# Patient Record
Sex: FEMALE | Race: WHITE | NOT HISPANIC OR LATINO | Employment: OTHER | URBAN - METROPOLITAN AREA
[De-identification: names, ages, dates, MRNs, and addresses within clinical notes are randomized per-mention and may not be internally consistent; named-entity substitution may affect disease eponyms.]

---

## 2023-04-19 ENCOUNTER — OFFICE VISIT (OUTPATIENT)
Dept: ORTHOPEDIC SURGERY | Facility: CLINIC | Age: 78
End: 2023-04-19
Payer: MEDICARE

## 2023-04-19 VITALS
BODY MASS INDEX: 26.75 KG/M2 | HEIGHT: 63 IN | WEIGHT: 151 LBS | SYSTOLIC BLOOD PRESSURE: 160 MMHG | DIASTOLIC BLOOD PRESSURE: 82 MMHG

## 2023-04-19 DIAGNOSIS — M16.12 PRIMARY OSTEOARTHRITIS OF LEFT HIP: Primary | ICD-10-CM

## 2023-04-19 DIAGNOSIS — M87.052 AVASCULAR NECROSIS OF BONE OF LEFT HIP: ICD-10-CM

## 2023-04-19 PROCEDURE — 1159F MED LIST DOCD IN RCRD: CPT | Performed by: ORTHOPAEDIC SURGERY

## 2023-04-19 PROCEDURE — 99204 OFFICE O/P NEW MOD 45 MIN: CPT | Performed by: ORTHOPAEDIC SURGERY

## 2023-04-19 PROCEDURE — 1160F RVW MEDS BY RX/DR IN RCRD: CPT | Performed by: ORTHOPAEDIC SURGERY

## 2023-04-19 RX ORDER — IBUPROFEN 200 MG
TABLET ORAL EVERY 6 HOURS PRN
COMMUNITY

## 2023-04-19 RX ORDER — CHOLECALCIFEROL (VITAMIN D3) 125 MCG
2.5 CAPSULE ORAL NIGHTLY
COMMUNITY

## 2023-04-19 RX ORDER — ACETAMINOPHEN 325 MG/1
1000 TABLET ORAL ONCE
OUTPATIENT
Start: 2023-04-19 | End: 2023-04-19

## 2023-04-19 RX ORDER — PREGABALIN 150 MG/1
150 CAPSULE ORAL ONCE
OUTPATIENT
Start: 2023-04-19 | End: 2023-04-19

## 2023-04-19 RX ORDER — AMLODIPINE BESYLATE 5 MG/1
1 TABLET ORAL DAILY
COMMUNITY
Start: 2023-04-06

## 2023-04-19 RX ORDER — DOXYCYCLINE 100 MG/1
1 CAPSULE ORAL EVERY 12 HOURS SCHEDULED
COMMUNITY
Start: 2023-04-17

## 2023-04-19 RX ORDER — TRAMADOL HYDROCHLORIDE 50 MG/1
50 TABLET ORAL EVERY 8 HOURS PRN
Qty: 30 TABLET | Refills: 0 | Status: SHIPPED | OUTPATIENT
Start: 2023-04-19

## 2023-04-19 RX ORDER — MELOXICAM 7.5 MG/1
15 TABLET ORAL ONCE
OUTPATIENT
Start: 2023-04-19 | End: 2023-04-19

## 2023-04-19 RX ORDER — ACETAMINOPHEN 500 MG
500 TABLET ORAL EVERY 6 HOURS PRN
COMMUNITY

## 2023-04-19 RX ORDER — CELECOXIB 100 MG/1
1 CAPSULE ORAL EVERY 12 HOURS SCHEDULED
COMMUNITY
Start: 2023-04-04

## 2023-04-19 RX ORDER — TRIAMTERENE AND HYDROCHLOROTHIAZIDE 37.5; 25 MG/1; MG/1
1 CAPSULE ORAL DAILY
COMMUNITY
Start: 2023-04-05

## 2023-04-19 NOTE — H&P (VIEW-ONLY)
[facility]    Subjective     Chief Complaint   Patient presents with   • Left Hip - Pain        HPI    Janet Darden is a 77 y.o. female who presents with new problem of: left hip pain.  Onset: atraumatic and gradual in nature. The issue has been ongoing for 4 year(s). Pain is a 10/10 on the pain scale. Pain is described as aching and shooting. Associated symptoms include pain and popping. The pain is worse with walking, standing, climbing stairs, sleeping and lying on affected side; heat and pain medication and/or NSAID improve the pain. Previous treatments have included: NSAIDS, physical therapy and steroid injection (last injection 08/2022), in [place] with Dr. Zayas.  Injection provided relief up until December, but she has had worsening pain since then.  Pain is located in the groin region.  No improvement with Celebrex.  She currently takes ibuprofen and Tylenol with some improvement.  She is interested in hip replacement surgery.  No history of clots or clotting disorders.  No blood thinners.  No heart disease or diabetes.  She has family members that can help her out postoperatively.  She lives in [place], [place].    I have reviewed the following portions of the patient's history and agree with: History of Present Illness and Review of Systems    Patient Active Problem List   Diagnosis   • Primary osteoarthritis of left hip     Past Medical History:   Diagnosis Date   • Hypertension       Past Surgical History:   Procedure Laterality Date   • CHOLECYSTECTOMY  1970   • HYSTERECTOMY  1994   • MOUTH SURGERY  1987    jaw surgery      Family History   Problem Relation Age of Onset   • Cancer Mother    • Cancer Father      Social History     Socioeconomic History   • Marital status:    Tobacco Use   • Smoking status: Never   • Smokeless tobacco: Never   Vaping Use   • Vaping Use: Never used   Substance and Sexual Activity   • Alcohol use: Never   • Drug use: Never   •  Sexual activity: Defer      Current Outpatient Medications on File Prior to Visit   Medication Sig Dispense Refill   • acetaminophen (TYLENOL) 500 MG tablet Take 1 tablet by mouth Every 6 (Six) Hours As Needed for Mild Pain.     • amLODIPine (NORVASC) 5 MG tablet Take 1 tablet by mouth Daily.     • Ascorbic Acid (VITAMIN C PO) Take 1,000 mg by mouth.     • Calcium Carbonate-Vitamin D (CALCIUM 600+D PO) Take  by mouth Daily.     • celecoxib (CeleBREX) 100 MG capsule Take 1 capsule by mouth Every 12 (Twelve) Hours.     • doxycycline (MONODOX) 100 MG capsule Take 1 capsule by mouth Every 12 (Twelve) Hours.     • ibuprofen (ADVIL,MOTRIN) 200 MG tablet Take  by mouth Every 6 (Six) Hours As Needed for Mild Pain. Takes 600mg every 6 hours     • melatonin 5 MG tablet tablet Take 2.5 mg by mouth Every Night.     • triamterene-hydrochlorothiazide (DYAZIDE) 37.5-25 MG per capsule Take 1 capsule by mouth Daily.     • Zinc 50 MG capsule Take  by mouth.       No current facility-administered medications on file prior to visit.      Allergies   Allergen Reactions   • Prednisone Other (See Comments)     Causes insomnia        Review of Systems   Constitutional: Negative for activity change, appetite change, chills, diaphoresis, fatigue, fever and unexpected weight change.   HENT: Negative for congestion, dental problem, drooling, ear discharge, ear pain, facial swelling, hearing loss, mouth sores, nosebleeds, postnasal drip, rhinorrhea, sinus pressure, sneezing, sore throat, tinnitus, trouble swallowing and voice change.    Eyes: Negative for photophobia, pain, discharge, redness, itching and visual disturbance.   Respiratory: Negative for apnea, cough, choking, chest tightness, shortness of breath, wheezing and stridor.    Cardiovascular: Negative for chest pain, palpitations and leg swelling.   Gastrointestinal: Negative for abdominal distention, abdominal pain, anal bleeding, blood in stool, constipation, diarrhea, nausea,  "rectal pain and vomiting.   Endocrine: Negative for cold intolerance, heat intolerance, polydipsia, polyphagia and polyuria.   Genitourinary: Negative for decreased urine volume, difficulty urinating, dysuria, enuresis, flank pain, frequency, genital sores, hematuria and urgency.   Musculoskeletal: Positive for arthralgias. Negative for back pain, gait problem, joint swelling, myalgias, neck pain and neck stiffness.   Skin: Negative for color change, pallor, rash and wound.   Allergic/Immunologic: Negative for environmental allergies, food allergies and immunocompromised state.   Neurological: Negative for dizziness, tremors, seizures, syncope, facial asymmetry, speech difficulty, weakness, light-headedness, numbness and headaches.   Hematological: Negative for adenopathy. Does not bruise/bleed easily.   Psychiatric/Behavioral: Negative for agitation, behavioral problems, confusion, decreased concentration, dysphoric mood, hallucinations, self-injury, sleep disturbance and suicidal ideas. The patient is not nervous/anxious and is not hyperactive.         Objective      Physical Exam  /82   Ht 158.8 cm (62.5\")   Wt 68.5 kg (151 lb)   BMI 27.18 kg/m²     Body mass index is 27.18 kg/m².    General:   Mental Status:  Alert   Appearance: Cooperative, in no acute distress   Build and Nutrition: Well-nourished well-developed female   Orientation: Alert and oriented to person, place and time   Posture: Normal   Gait: Limp on the left    Integument:   Left hip: No skin lesions, no rash, no ecchymosis    Neurologic:   Sensation:    Left foot: Intact to light touch on the dorsal and plantar aspect   Motor:  Left lower extremity: 5/5 quadriceps, hamstrings, ankle dorsiflexors, and ankle plantar flexors  Vascular:   Left lower extremity: 2+ dorsalis pedis pulse, prompt capillary refill    Lower Extremity:   Left Hip:    Tenderness:  Mild lateral tenderness    Swelling:  None    Crepitus:  Positive, palpable and " audible    Atrophy:  None    Range of motion:  External Rotation: 10°, with pain       Internal Rotation: 10°, with pain       Flexion:  90°       Extension:  0°   Instability:  None  Deformities:  None  Functional testing: Positive Stinchfield    Short on the left compared to the right      Imaging/Studies      Imaging Results (Last 24 Hours)     Procedure Component Value Units Date/Time    XR Hip With or Without Pelvis 2 - 3 View Left [[id number]] Resulted: 04/19/23 1541     Updated: 04/19/23 1542    Narrative:      Left Hip Radiographs  Indication: left hip pain  Views: low AP pelvis and lateral of the left hip    Comparison: no prior studies available for review    Findings:   Collapse of the femoral head, with no acute bony abnormalities.  Advanced   degeneration of the left hip, likely secondary to avascular necrosis.          Assessment and Plan     Diagnoses and all orders for this visit:    1. Primary osteoarthritis of left hip (Primary)  -     XR Hip With or Without Pelvis 2 - 3 View Left  -     traMADol (ULTRAM) 50 MG tablet; Take 1 tablet by mouth Every 8 (Eight) Hours As Needed for Moderate Pain.  Dispense: 30 tablet; Refill: 0  -     Case Request; Standing  -     Instructions on coughing, deep breathing, and incentive spirometry.; Future  -     CBC and Differential; Future  -     Basic metabolic panel; Future  -     Protime-INR; Future  -     APTT; Future  -     Hemoglobin A1c; Future  -     Sedimentation rate; Future  -     C-reactive protein; Future  -     Tranexamic Acid 1,000 mg in sodium chloride 0.9 % 100 mL  -     Tranexamic Acid 1,000 mg in sodium chloride 0.9 % 100 mL  -     ethyl alcohol 62 % 2 each  -     ceFAZolin (ANCEF) 2 g in sodium chloride 0.9 % 100 mL IVPB  -     acetaminophen (TYLENOL) tablet 975 mg  -     meloxicam (MOBIC) tablet 15 mg  -     pregabalin (LYRICA) capsule 150 mg  -     Case Request    2. Avascular necrosis of bone of left hip    Other orders  -     Follow Anesthesia  Guidelines / Protocol; Future  -     Obtain informed consent  -     Provide instructions to patient regarding NPO status  -     Chlorhexidine Skin Prep - Educate and Review With Patient; Future  -     Provide Patient With ERAS Hydration Instructions  -     Provide Patient With Enhanced Recovery Booklet(s) or Handout  -     Provide Instructions/Handout For Benzoyl Peroxide 5% Wash If Having Shoulder/Arm Surgery (If Prescribed)  -     Provide Instructions/Handout For Bactroban And Chlorhexidine Shower (If Prescribed)  -     Perform A Memory Screening On All Hip/Knee Replacement Patients >Or Equal To 65 Years Or Older  -     Complete A PROMIS And HOOS Or KOOS Survey If Having Hip Or Knee Replacement  -     Follow Anesthesia Guidelines / Protocol; Standing  -     Verify NPO Status; Standing  -     Verify The Time Patient Completed ERAS Hydration Drink; Standing  -     SCD (sequential compression device)- to be placed on patient in Pre-op; Standing  -     Clip operative site; Standing  -     Obtain informed consent (if not collected inpatient or PAT); Standing  -     Notify Physician - Standard; Standing  -     Provide Patient With Carbo Loading Instructions  -     Provide Patient With ERAS Booklet(s)/Handout  -     Outpatient In A Bed; Standing  -     chlorhexidine (HIBICLENS) 4 % external liquid; Apply  topically to the appropriate area as directed Daily. Shower with hibiclens solution as directed for 5 days prior to surgery  Dispense: 236 mL; Refill: 0        1. Primary osteoarthritis of left hip    2. Avascular necrosis of bone of left hip        I reviewed my findings with the patient.  She has advanced left hip arthritis, with collapse of the femoral head.  Symptoms have worsened over time.  She has exhausted conservative treatment options.  She is very interested in total hip arthroplasty surgery.  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  Risks, benefits, and  alternatives to surgery been discussed.  Her  and daughter are with her today, and are also supportive of her proceeding with hip replacement surgery.  Please see my counseling note for details.  I did provide a limited prescription of tramadol for her to use at nighttime in hopes that this will help between now and the time of surgery.    Surgical Counseling     I have informed the patient of the diagnosis and the prognosis.  Exhaustive conservative treatment modalities have not resulted in long term pain relief.  The symptoms have progressed to the point of daily pain and inability to perform activities of daily living without significant pain.  The patient has reached the point of desiring to proceed with total hip arthroplasty after discussing the risks, benefits and alternatives to the procedure.  The surgical procedure itself was discussed in detail.  Risks of the procedure were discussed, which included but are not limited to, bleeding, infection, damage to blood vessels and nerves, incomplete pain relief, loosening of the prosthesis (early or late), deep infection (early or late), need for further surgery, leg length discrepancy, hip dislocation, loss of limb, deep venous thrombosis, pulmonary embolus, death, heart attack, stroke, kidney failure, liver failure, and anesthetic complications.  In addition, the potential for deep infection developing in the future was discussed, which could require further surgery.  The hip would have to be re-opened, debrided, and potentially remove the prosthesis, which may or may not be replaced in the future.  Also, the possibility for loosening of the prosthesis has been mentioned.  If the prosthesis loosened, a revision arthroplasty could be performed, with results that are not as predictable compared to the original procedure.  The typical rehabilitative course has also been discussed, and full recovery may take up to a year to see the maximum benefit.  The  importance of patient cooperation in the rehabilitative efforts has also been discussed.  No guarantees were given.  The patient understands the potential risks versus the benefits and desires to proceed with total hip arthroplasty at a mutually convenient time.    Return for surgery.      Ezekiel Rodriguez MD  04/19/23  18:57 EDT

## 2023-04-19 NOTE — PROGRESS NOTES
[facility] Note    Subjective     Chief Complaint   Patient presents with   • Left Hip - Pain        HPI    Janet Darden is a 77 y.o. female who presents with new problem of: left hip pain.  Onset: atraumatic and gradual in nature. The issue has been ongoing for 4 year(s). Pain is a 10/10 on the pain scale. Pain is described as aching and shooting. Associated symptoms include pain and popping. The pain is worse with walking, standing, climbing stairs, sleeping and lying on affected side; heat and pain medication and/or NSAID improve the pain. Previous treatments have included: NSAIDS, physical therapy and steroid injection (last injection 08/2022), in [place] with Dr. Zayas.  Injection provided relief up until December, but she has had worsening pain since then.  Pain is located in the groin region.  No improvement with Celebrex.  She currently takes ibuprofen and Tylenol with some improvement.  She is interested in hip replacement surgery.  No history of clots or clotting disorders.  No blood thinners.  No heart disease or diabetes.  She has family members that can help her out postoperatively.  She lives in [place], [place].    I have reviewed the following portions of the patient's history and agree with: History of Present Illness and Review of Systems    Patient Active Problem List   Diagnosis   • Primary osteoarthritis of left hip     Past Medical History:   Diagnosis Date   • Hypertension       Past Surgical History:   Procedure Laterality Date   • CHOLECYSTECTOMY  1970   • HYSTERECTOMY  1994   • MOUTH SURGERY  1987    jaw surgery      Family History   Problem Relation Age of Onset   • Cancer Mother    • Cancer Father      Social History     Socioeconomic History   • Marital status:    Tobacco Use   • Smoking status: Never   • Smokeless tobacco: Never   Vaping Use   • Vaping Use: Never used   Substance and Sexual Activity   • Alcohol use: Never   • Drug use: Never   •  Sexual activity: Defer      Current Outpatient Medications on File Prior to Visit   Medication Sig Dispense Refill   • acetaminophen (TYLENOL) 500 MG tablet Take 1 tablet by mouth Every 6 (Six) Hours As Needed for Mild Pain.     • amLODIPine (NORVASC) 5 MG tablet Take 1 tablet by mouth Daily.     • Ascorbic Acid (VITAMIN C PO) Take 1,000 mg by mouth.     • Calcium Carbonate-Vitamin D (CALCIUM 600+D PO) Take  by mouth Daily.     • celecoxib (CeleBREX) 100 MG capsule Take 1 capsule by mouth Every 12 (Twelve) Hours.     • doxycycline (MONODOX) 100 MG capsule Take 1 capsule by mouth Every 12 (Twelve) Hours.     • ibuprofen (ADVIL,MOTRIN) 200 MG tablet Take  by mouth Every 6 (Six) Hours As Needed for Mild Pain. Takes 600mg every 6 hours     • melatonin 5 MG tablet tablet Take 2.5 mg by mouth Every Night.     • triamterene-hydrochlorothiazide (DYAZIDE) 37.5-25 MG per capsule Take 1 capsule by mouth Daily.     • Zinc 50 MG capsule Take  by mouth.       No current facility-administered medications on file prior to visit.      Allergies   Allergen Reactions   • Prednisone Other (See Comments)     Causes insomnia        Review of Systems   Constitutional: Negative for activity change, appetite change, chills, diaphoresis, fatigue, fever and unexpected weight change.   HENT: Negative for congestion, dental problem, drooling, ear discharge, ear pain, facial swelling, hearing loss, mouth sores, nosebleeds, postnasal drip, rhinorrhea, sinus pressure, sneezing, sore throat, tinnitus, trouble swallowing and voice change.    Eyes: Negative for photophobia, pain, discharge, redness, itching and visual disturbance.   Respiratory: Negative for apnea, cough, choking, chest tightness, shortness of breath, wheezing and stridor.    Cardiovascular: Negative for chest pain, palpitations and leg swelling.   Gastrointestinal: Negative for abdominal distention, abdominal pain, anal bleeding, blood in stool, constipation, diarrhea, nausea,  "rectal pain and vomiting.   Endocrine: Negative for cold intolerance, heat intolerance, polydipsia, polyphagia and polyuria.   Genitourinary: Negative for decreased urine volume, difficulty urinating, dysuria, enuresis, flank pain, frequency, genital sores, hematuria and urgency.   Musculoskeletal: Positive for arthralgias. Negative for back pain, gait problem, joint swelling, myalgias, neck pain and neck stiffness.   Skin: Negative for color change, pallor, rash and wound.   Allergic/Immunologic: Negative for environmental allergies, food allergies and immunocompromised state.   Neurological: Negative for dizziness, tremors, seizures, syncope, facial asymmetry, speech difficulty, weakness, light-headedness, numbness and headaches.   Hematological: Negative for adenopathy. Does not bruise/bleed easily.   Psychiatric/Behavioral: Negative for agitation, behavioral problems, confusion, decreased concentration, dysphoric mood, hallucinations, self-injury, sleep disturbance and suicidal ideas. The patient is not nervous/anxious and is not hyperactive.         Objective      Physical Exam  /82   Ht 158.8 cm (62.5\")   Wt 68.5 kg (151 lb)   BMI 27.18 kg/m²     Body mass index is 27.18 kg/m².    General:   Mental Status:  Alert   Appearance: Cooperative, in no acute distress   Build and Nutrition: Well-nourished well-developed female   Orientation: Alert and oriented to person, place and time   Posture: Normal   Gait: Limp on the left    Integument:   Left hip: No skin lesions, no rash, no ecchymosis    Neurologic:   Sensation:    Left foot: Intact to light touch on the dorsal and plantar aspect   Motor:  Left lower extremity: 5/5 quadriceps, hamstrings, ankle dorsiflexors, and ankle plantar flexors  Vascular:   Left lower extremity: 2+ dorsalis pedis pulse, prompt capillary refill    Lower Extremity:   Left Hip:    Tenderness:  Mild lateral tenderness    Swelling:  None    Crepitus:  Positive, palpable and " audible    Atrophy:  None    Range of motion:  External Rotation: 10°, with pain       Internal Rotation: 10°, with pain       Flexion:  90°       Extension:  0°   Instability:  None  Deformities:  None  Functional testing: Positive Stinchfield    Short on the left compared to the right      Imaging/Studies      Imaging Results (Last 24 Hours)     Procedure Component Value Units Date/Time    XR Hip With or Without Pelvis 2 - 3 View Left [[id number]] Resulted: 04/19/23 1541     Updated: 04/19/23 1542    Narrative:      Left Hip Radiographs  Indication: left hip pain  Views: low AP pelvis and lateral of the left hip    Comparison: no prior studies available for review    Findings:   Collapse of the femoral head, with no acute bony abnormalities.  Advanced   degeneration of the left hip, likely secondary to avascular necrosis.          Assessment and Plan     Diagnoses and all orders for this visit:    1. Primary osteoarthritis of left hip (Primary)  -     XR Hip With or Without Pelvis 2 - 3 View Left  -     traMADol (ULTRAM) 50 MG tablet; Take 1 tablet by mouth Every 8 (Eight) Hours As Needed for Moderate Pain.  Dispense: 30 tablet; Refill: 0  -     Case Request; Standing  -     Instructions on coughing, deep breathing, and incentive spirometry.; Future  -     CBC and Differential; Future  -     Basic metabolic panel; Future  -     Protime-INR; Future  -     APTT; Future  -     Hemoglobin A1c; Future  -     Sedimentation rate; Future  -     C-reactive protein; Future  -     Tranexamic Acid 1,000 mg in sodium chloride 0.9 % 100 mL  -     Tranexamic Acid 1,000 mg in sodium chloride 0.9 % 100 mL  -     ethyl alcohol 62 % 2 each  -     ceFAZolin (ANCEF) 2 g in sodium chloride 0.9 % 100 mL IVPB  -     acetaminophen (TYLENOL) tablet 975 mg  -     meloxicam (MOBIC) tablet 15 mg  -     pregabalin (LYRICA) capsule 150 mg  -     Case Request    2. Avascular necrosis of bone of left hip    Other orders  -     Follow Anesthesia  Guidelines / Protocol; Future  -     Obtain informed consent  -     Provide instructions to patient regarding NPO status  -     Chlorhexidine Skin Prep - Educate and Review With Patient; Future  -     Provide Patient With ERAS Hydration Instructions  -     Provide Patient With Enhanced Recovery Booklet(s) or Handout  -     Provide Instructions/Handout For Benzoyl Peroxide 5% Wash If Having Shoulder/Arm Surgery (If Prescribed)  -     Provide Instructions/Handout For Bactroban And Chlorhexidine Shower (If Prescribed)  -     Perform A Memory Screening On All Hip/Knee Replacement Patients >Or Equal To 65 Years Or Older  -     Complete A PROMIS And HOOS Or KOOS Survey If Having Hip Or Knee Replacement  -     Follow Anesthesia Guidelines / Protocol; Standing  -     Verify NPO Status; Standing  -     Verify The Time Patient Completed ERAS Hydration Drink; Standing  -     SCD (sequential compression device)- to be placed on patient in Pre-op; Standing  -     Clip operative site; Standing  -     Obtain informed consent (if not collected inpatient or PAT); Standing  -     Notify Physician - Standard; Standing  -     Provide Patient With Carbo Loading Instructions  -     Provide Patient With ERAS Booklet(s)/Handout  -     Outpatient In A Bed; Standing  -     chlorhexidine (HIBICLENS) 4 % external liquid; Apply  topically to the appropriate area as directed Daily. Shower with hibiclens solution as directed for 5 days prior to surgery  Dispense: 236 mL; Refill: 0        1. Primary osteoarthritis of left hip    2. Avascular necrosis of bone of left hip        I reviewed my findings with the patient.  She has advanced left hip arthritis, with collapse of the femoral head.  Symptoms have worsened over time.  She has exhausted conservative treatment options.  She is very interested in total hip arthroplasty surgery.  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  Risks, benefits, and  alternatives to surgery been discussed.  Her  and daughter are with her today, and are also supportive of her proceeding with hip replacement surgery.  Please see my counseling note for details.  I did provide a limited prescription of tramadol for her to use at nighttime in hopes that this will help between now and the time of surgery.    Surgical Counseling     I have informed the patient of the diagnosis and the prognosis.  Exhaustive conservative treatment modalities have not resulted in long term pain relief.  The symptoms have progressed to the point of daily pain and inability to perform activities of daily living without significant pain.  The patient has reached the point of desiring to proceed with total hip arthroplasty after discussing the risks, benefits and alternatives to the procedure.  The surgical procedure itself was discussed in detail.  Risks of the procedure were discussed, which included but are not limited to, bleeding, infection, damage to blood vessels and nerves, incomplete pain relief, loosening of the prosthesis (early or late), deep infection (early or late), need for further surgery, leg length discrepancy, hip dislocation, loss of limb, deep venous thrombosis, pulmonary embolus, death, heart attack, stroke, kidney failure, liver failure, and anesthetic complications.  In addition, the potential for deep infection developing in the future was discussed, which could require further surgery.  The hip would have to be re-opened, debrided, and potentially remove the prosthesis, which may or may not be replaced in the future.  Also, the possibility for loosening of the prosthesis has been mentioned.  If the prosthesis loosened, a revision arthroplasty could be performed, with results that are not as predictable compared to the original procedure.  The typical rehabilitative course has also been discussed, and full recovery may take up to a year to see the maximum benefit.  The  importance of patient cooperation in the rehabilitative efforts has also been discussed.  No guarantees were given.  The patient understands the potential risks versus the benefits and desires to proceed with total hip arthroplasty at a mutually convenient time.    Return for surgery.      Ezekiel Rodriguez MD  04/19/23  18:57 EDT

## 2023-04-26 ENCOUNTER — PRE-ADMISSION TESTING (OUTPATIENT)
Dept: PREADMISSION TESTING | Facility: HOSPITAL | Age: 78
End: 2023-04-26
Payer: MEDICARE

## 2023-04-26 VITALS — BODY MASS INDEX: 29.14 KG/M2 | WEIGHT: 154.32 LBS | HEIGHT: 61 IN

## 2023-04-26 DIAGNOSIS — M16.12 PRIMARY OSTEOARTHRITIS OF LEFT HIP: ICD-10-CM

## 2023-04-26 LAB
ANION GAP SERPL CALCULATED.3IONS-SCNC: 13 MMOL/L (ref 5–15)
APTT PPP: 25 SECONDS (ref 22–39)
BASOPHILS # BLD AUTO: 0.06 10*3/MM3 (ref 0–0.2)
BASOPHILS NFR BLD AUTO: 0.6 % (ref 0–1.5)
BUN SERPL-MCNC: 32 MG/DL (ref 8–23)
BUN/CREAT SERPL: 38.1 (ref 7–25)
CALCIUM SPEC-SCNC: 10.2 MG/DL (ref 8.6–10.5)
CHLORIDE SERPL-SCNC: 102 MMOL/L (ref 98–107)
CO2 SERPL-SCNC: 23 MMOL/L (ref 22–29)
CREAT SERPL-MCNC: 0.84 MG/DL (ref 0.57–1)
CRP SERPL-MCNC: 0.47 MG/DL (ref 0–0.5)
DEPRECATED RDW RBC AUTO: 45.6 FL (ref 37–54)
EGFRCR SERPLBLD CKD-EPI 2021: 71.7 ML/MIN/1.73
EOSINOPHIL # BLD AUTO: 0.39 10*3/MM3 (ref 0–0.4)
EOSINOPHIL NFR BLD AUTO: 3.8 % (ref 0.3–6.2)
ERYTHROCYTE [DISTWIDTH] IN BLOOD BY AUTOMATED COUNT: 13.4 % (ref 12.3–15.4)
ERYTHROCYTE [SEDIMENTATION RATE] IN BLOOD: 8 MM/HR (ref 0–30)
GLUCOSE SERPL-MCNC: 135 MG/DL (ref 65–99)
HBA1C MFR BLD: 5.8 % (ref 4.8–5.6)
HCT VFR BLD AUTO: 41 % (ref 34–46.6)
HGB BLD-MCNC: 13.6 G/DL (ref 12–15.9)
IMM GRANULOCYTES # BLD AUTO: 0.11 10*3/MM3 (ref 0–0.05)
IMM GRANULOCYTES NFR BLD AUTO: 1.1 % (ref 0–0.5)
INR PPP: 0.95 (ref 0.89–1.12)
LYMPHOCYTES # BLD AUTO: 3.17 10*3/MM3 (ref 0.7–3.1)
LYMPHOCYTES NFR BLD AUTO: 30.5 % (ref 19.6–45.3)
MCH RBC QN AUTO: 30.6 PG (ref 26.6–33)
MCHC RBC AUTO-ENTMCNC: 33.2 G/DL (ref 31.5–35.7)
MCV RBC AUTO: 92.3 FL (ref 79–97)
MONOCYTES # BLD AUTO: 0.87 10*3/MM3 (ref 0.1–0.9)
MONOCYTES NFR BLD AUTO: 8.4 % (ref 5–12)
NEUTROPHILS NFR BLD AUTO: 5.8 10*3/MM3 (ref 1.7–7)
NEUTROPHILS NFR BLD AUTO: 55.6 % (ref 42.7–76)
NRBC BLD AUTO-RTO: 0 /100 WBC (ref 0–0.2)
PLATELET # BLD AUTO: 321 10*3/MM3 (ref 140–450)
PMV BLD AUTO: 9.7 FL (ref 6–12)
POTASSIUM SERPL-SCNC: 4 MMOL/L (ref 3.5–5.2)
PROTHROMBIN TIME: 12.7 SECONDS (ref 12.2–14.5)
RBC # BLD AUTO: 4.44 10*6/MM3 (ref 3.77–5.28)
SODIUM SERPL-SCNC: 138 MMOL/L (ref 136–145)
WBC NRBC COR # BLD: 10.4 10*3/MM3 (ref 3.4–10.8)

## 2023-04-26 PROCEDURE — 85025 COMPLETE CBC W/AUTO DIFF WBC: CPT

## 2023-04-26 PROCEDURE — 85610 PROTHROMBIN TIME: CPT

## 2023-04-26 PROCEDURE — 93010 ELECTROCARDIOGRAM REPORT: CPT | Performed by: INTERNAL MEDICINE

## 2023-04-26 PROCEDURE — 85730 THROMBOPLASTIN TIME PARTIAL: CPT

## 2023-04-26 PROCEDURE — 83036 HEMOGLOBIN GLYCOSYLATED A1C: CPT

## 2023-04-26 PROCEDURE — 80048 BASIC METABOLIC PNL TOTAL CA: CPT

## 2023-04-26 PROCEDURE — 93005 ELECTROCARDIOGRAM TRACING: CPT

## 2023-04-26 PROCEDURE — 36415 COLL VENOUS BLD VENIPUNCTURE: CPT

## 2023-04-26 PROCEDURE — 86140 C-REACTIVE PROTEIN: CPT

## 2023-04-26 PROCEDURE — 85652 RBC SED RATE AUTOMATED: CPT

## 2023-04-26 RX ORDER — CETIRIZINE HYDROCHLORIDE 10 MG/1
10 TABLET ORAL DAILY
COMMUNITY

## 2023-04-26 NOTE — PAT
Patient to apply Chlorhexadine wipes  to surgical area (as instructed) the night before procedure and the AM of procedure. Wipes provided.    Patient instructed to drink 20 ounces of Gatorade and it needs to be completed 1 hour (for Main OR patients) or 2 hours (scheduled  section & [facility] patients) before given arrival time for procedure (NO RED Gatorade)    Patient verbalized understanding.    Patient viewed general PAT education video as instructed in their preoperative information received from their surgeon.  Patient stated the general PAT education video was viewed in its entirety and survey completed.  Copies of PAT general education handouts (Incentive Spirometry, Meds to Beds Program, Patient Belongings, Pre-op skin preparation instructions, Blood Glucose testing, Visitor policy, Surgery FAQ, Code H) distributed to patient if not printed. Education related to the PAT pass and skin preparation for surgery (if applicable) completed in PAT as a reinforcement to PAT education video. Patient instructed to return PAT pass provided today as well as completed skin preparation sheet (if applicable) on the day of procedure.     Additionally if patient had not viewed video yet but intended to view it at home or in our waiting area, then referred them to the handout with QR code/link provided during PAT visit.  Instructed patient to complete survey after viewing the video in its entirety.  Encouraged patient/family to read PAT general education handouts thoroughly and notify PAT staff with any questions or concerns. Patient verbalized understanding of all information and priority content.    An arrival time for procedure was not provided during PAT visit. If patient had any questions or concerns about their arrival time, they were instructed to contact their surgeon/physician.  Additionally, if the patient referred to an arrival time that was acquired from their my chart account, patient was encouraged to  verify that time with their surgeon/physician. Arrival times are NOT provided in Pre Admission Testing Department.    Prescription for Chlorhexidine shower called into patient's pharmacy or [facility] pharmacy by patient's surgeon.  Reinforced with patient to  the prescription from applicable pharmacy if they haven't already.  Verbal and written instructions given regarding proper use of Chlorhexidine body wash to patient and/or famlily during PAT visit. Patient/family also instructed to complete checklist and return it to Pre-op on the day of surgery.  Patient and/or family verbalized understanding.    Patient denies any current skin issues.     Discussed with patient options for receiving total joint replacement education and assessed patient's ability and preference. Joint Replacement Guide given to patient during PAT visit since not received a copy within the last year. Encouraged patient/family to read guide thoroughly and notify PAT staff with any questions or concerns. Handout provided directing patient to links to watch online videos related to joint replacement surgery on the [facility] website. The handout gives detailed instructions for joining an online joint replacement class through Zoom or phone conference offered on Thursdays. Patient agreed to participate by joining online class through Zoom. Patient verbalized understanding of instructions and to complete the online learning tool survey. Encouraged to share information with family and/or . An overview of the joint replacement education was provided during the visit including general perioperative instructions that are routine for all surgical patients (PAT PASS, wipes, directions to pre-op, etc.).    Patient reports last dose of Doxycycline today for Sinus Infection. Symptoms resolved.  Reported to Maryse Love in Dr. Rodriguez's office.

## 2023-04-28 LAB
QT INTERVAL: 372 MS
QTC INTERVAL: 465 MS

## 2023-05-05 ENCOUNTER — ANESTHESIA (OUTPATIENT)
Dept: PERIOP | Facility: HOSPITAL | Age: 78
End: 2023-05-05
Payer: MEDICARE

## 2023-05-05 ENCOUNTER — HOSPITAL ENCOUNTER (OUTPATIENT)
Facility: HOSPITAL | Age: 78
Discharge: NURSING, REHAB OR LONG-TERM CARE | End: 2023-05-08
Attending: ORTHOPAEDIC SURGERY | Admitting: ORTHOPAEDIC SURGERY
Payer: MEDICARE

## 2023-05-05 ENCOUNTER — ANESTHESIA EVENT (OUTPATIENT)
Dept: PERIOP | Facility: HOSPITAL | Age: 78
End: 2023-05-05
Payer: MEDICARE

## 2023-05-05 ENCOUNTER — TELEPHONE (OUTPATIENT)
Dept: ORTHOPEDIC SURGERY | Facility: CLINIC | Age: 78
End: 2023-05-05

## 2023-05-05 ENCOUNTER — APPOINTMENT (OUTPATIENT)
Dept: GENERAL RADIOLOGY | Facility: HOSPITAL | Age: 78
End: 2023-05-05
Payer: MEDICARE

## 2023-05-05 DIAGNOSIS — Z96.642 S/P TOTAL LEFT HIP ARTHROPLASTY: Primary | ICD-10-CM

## 2023-05-05 DIAGNOSIS — M16.12 PRIMARY OSTEOARTHRITIS OF LEFT HIP: ICD-10-CM

## 2023-05-05 PROBLEM — I10 HTN (HYPERTENSION): Status: ACTIVE | Noted: 2023-05-05

## 2023-05-05 PROCEDURE — A9270 NON-COVERED ITEM OR SERVICE: HCPCS | Performed by: ORTHOPAEDIC SURGERY

## 2023-05-05 PROCEDURE — 27130 TOTAL HIP ARTHROPLASTY: CPT | Performed by: ORTHOPAEDIC SURGERY

## 2023-05-05 PROCEDURE — 97110 THERAPEUTIC EXERCISES: CPT

## 2023-05-05 PROCEDURE — 73502 X-RAY EXAM HIP UNI 2-3 VIEWS: CPT

## 2023-05-05 PROCEDURE — 63710000001 PREGABALIN 150 MG CAPSULE: Performed by: ORTHOPAEDIC SURGERY

## 2023-05-05 PROCEDURE — 76000 FLUOROSCOPY <1 HR PHYS/QHP: CPT

## 2023-05-05 PROCEDURE — 27130 TOTAL HIP ARTHROPLASTY: CPT | Performed by: PHYSICIAN ASSISTANT

## 2023-05-05 PROCEDURE — 25010000002 PHENYLEPHRINE 10 MG/ML SOLUTION 1 ML VIAL: Performed by: NURSE ANESTHETIST, CERTIFIED REGISTERED

## 2023-05-05 PROCEDURE — C1776 JOINT DEVICE (IMPLANTABLE): HCPCS | Performed by: ORTHOPAEDIC SURGERY

## 2023-05-05 PROCEDURE — 63710000001 ACETAMINOPHEN 500 MG TABLET: Performed by: ORTHOPAEDIC SURGERY

## 2023-05-05 PROCEDURE — 63710000001 MELOXICAM 15 MG TABLET: Performed by: ORTHOPAEDIC SURGERY

## 2023-05-05 PROCEDURE — 63710000001 FAMOTIDINE 20 MG TABLET: Performed by: ANESTHESIOLOGY

## 2023-05-05 PROCEDURE — 63710000001 MELATONIN 5 MG TABLET: Performed by: INTERNAL MEDICINE

## 2023-05-05 PROCEDURE — 97161 PT EVAL LOW COMPLEX 20 MIN: CPT

## 2023-05-05 PROCEDURE — 25010000002 CEFAZOLIN IN DEXTROSE 2-4 GM/100ML-% SOLUTION: Performed by: ORTHOPAEDIC SURGERY

## 2023-05-05 PROCEDURE — A9270 NON-COVERED ITEM OR SERVICE: HCPCS | Performed by: ANESTHESIOLOGY

## 2023-05-05 PROCEDURE — A9270 NON-COVERED ITEM OR SERVICE: HCPCS | Performed by: INTERNAL MEDICINE

## 2023-05-05 PROCEDURE — 63710000001 OXYCODONE 5 MG TABLET: Performed by: ORTHOPAEDIC SURGERY

## 2023-05-05 PROCEDURE — 25010000002 ROPIVACAINE PER 1 MG: Performed by: ORTHOPAEDIC SURGERY

## 2023-05-05 PROCEDURE — 25010000002 PROPOFOL 10 MG/ML EMULSION: Performed by: ANESTHESIOLOGY

## 2023-05-05 DEVICE — REFLECTION SPHERICAL HEAD SCREW 25MM
Type: IMPLANTABLE DEVICE | Site: HIP | Status: FUNCTIONAL
Brand: REFLECTION

## 2023-05-05 DEVICE — DEV CONTRL TISS STRATAFIX SPIRAL MNCRYL UD 3/0 PLS 60CM: Type: IMPLANTABLE DEVICE | Site: HIP | Status: FUNCTIONAL

## 2023-05-05 DEVICE — R3 3 HOLE ACETABULAR SHELL 52MM
Type: IMPLANTABLE DEVICE | Site: HIP | Status: FUNCTIONAL
Brand: R3 ACETABULAR

## 2023-05-05 DEVICE — R3 0 DEGREE XLPE ACETABULAR LINER                                    36MM INNER DIAMETER X OUTER DIAMETER 52MM
Type: IMPLANTABLE DEVICE | Site: HIP | Status: FUNCTIONAL
Brand: R3

## 2023-05-05 DEVICE — R3 US DELTA HEAD 36 +0
Type: IMPLANTABLE DEVICE | Site: HIP | Status: FUNCTIONAL
Brand: BIOLOX DELTA

## 2023-05-05 DEVICE — IMPLANTABLE DEVICE: Type: IMPLANTABLE DEVICE | Site: HIP | Status: FUNCTIONAL

## 2023-05-05 DEVICE — POLARSTEM COLLAR STANDARD                                    NON-CEMENTED WITH TI/HA 2
Type: IMPLANTABLE DEVICE | Site: HIP | Status: FUNCTIONAL
Brand: POLARSTEM

## 2023-05-05 DEVICE — DEV CONTRL TISS STRATAFIX SYMM PDS PLUS VIL CT-1 45CM: Type: IMPLANTABLE DEVICE | Site: HIP | Status: FUNCTIONAL

## 2023-05-05 RX ORDER — MELOXICAM 15 MG/1
15 TABLET ORAL DAILY
Status: DISCONTINUED | OUTPATIENT
Start: 2023-05-06 | End: 2023-05-08 | Stop reason: HOSPADM

## 2023-05-05 RX ORDER — SODIUM CHLORIDE 0.9 % (FLUSH) 0.9 %
10 SYRINGE (ML) INJECTION AS NEEDED
Status: CANCELLED | OUTPATIENT
Start: 2023-05-05

## 2023-05-05 RX ORDER — FAMOTIDINE 20 MG/1
20 TABLET, FILM COATED ORAL ONCE
Status: COMPLETED | OUTPATIENT
Start: 2023-05-05 | End: 2023-05-05

## 2023-05-05 RX ORDER — TRANEXAMIC ACID 10 MG/ML
1000 INJECTION, SOLUTION INTRAVENOUS ONCE
Status: COMPLETED | OUTPATIENT
Start: 2023-05-05 | End: 2023-05-05

## 2023-05-05 RX ORDER — CHOLECALCIFEROL (VITAMIN D3) 125 MCG
2.5 CAPSULE ORAL NIGHTLY
Status: DISCONTINUED | OUTPATIENT
Start: 2023-05-05 | End: 2023-05-08 | Stop reason: HOSPADM

## 2023-05-05 RX ORDER — SODIUM CHLORIDE 9 MG/ML
40 INJECTION, SOLUTION INTRAVENOUS AS NEEDED
Status: DISCONTINUED | OUTPATIENT
Start: 2023-05-05 | End: 2023-05-08 | Stop reason: HOSPADM

## 2023-05-05 RX ORDER — ASPIRIN 325 MG
325 TABLET, DELAYED RELEASE (ENTERIC COATED) ORAL DAILY
Status: DISCONTINUED | OUTPATIENT
Start: 2023-05-06 | End: 2023-05-08 | Stop reason: HOSPADM

## 2023-05-05 RX ORDER — SODIUM CHLORIDE 0.9 % (FLUSH) 0.9 %
10 SYRINGE (ML) INJECTION EVERY 12 HOURS SCHEDULED
Status: DISCONTINUED | OUTPATIENT
Start: 2023-05-05 | End: 2023-05-08 | Stop reason: HOSPADM

## 2023-05-05 RX ORDER — SODIUM CHLORIDE, SODIUM LACTATE, POTASSIUM CHLORIDE, CALCIUM CHLORIDE 600; 310; 30; 20 MG/100ML; MG/100ML; MG/100ML; MG/100ML
9 INJECTION, SOLUTION INTRAVENOUS CONTINUOUS
Status: DISCONTINUED | OUTPATIENT
Start: 2023-05-05 | End: 2023-05-05

## 2023-05-05 RX ORDER — FENTANYL CITRATE 50 UG/ML
50 INJECTION, SOLUTION INTRAMUSCULAR; INTRAVENOUS
Status: DISCONTINUED | OUTPATIENT
Start: 2023-05-05 | End: 2023-05-05

## 2023-05-05 RX ORDER — CELECOXIB 50 MG/1
50 CAPSULE ORAL 2 TIMES DAILY
COMMUNITY

## 2023-05-05 RX ORDER — AMLODIPINE BESYLATE 5 MG/1
5 TABLET ORAL DAILY
Status: DISCONTINUED | OUTPATIENT
Start: 2023-05-06 | End: 2023-05-08 | Stop reason: HOSPADM

## 2023-05-05 RX ORDER — SODIUM CHLORIDE 0.9 % (FLUSH) 0.9 %
1-10 SYRINGE (ML) INJECTION AS NEEDED
Status: DISCONTINUED | OUTPATIENT
Start: 2023-05-05 | End: 2023-05-08 | Stop reason: HOSPADM

## 2023-05-05 RX ORDER — FAMOTIDINE 10 MG/ML
20 INJECTION, SOLUTION INTRAVENOUS ONCE
Status: CANCELLED | OUTPATIENT
Start: 2023-05-05 | End: 2023-05-05

## 2023-05-05 RX ORDER — CEFAZOLIN SODIUM 2 G/100ML
2 INJECTION, SOLUTION INTRAVENOUS EVERY 8 HOURS
Status: COMPLETED | OUTPATIENT
Start: 2023-05-05 | End: 2023-05-06

## 2023-05-05 RX ORDER — NALOXONE HCL 0.4 MG/ML
0.4 VIAL (ML) INJECTION
Status: DISCONTINUED | OUTPATIENT
Start: 2023-05-05 | End: 2023-05-08 | Stop reason: HOSPADM

## 2023-05-05 RX ORDER — ACETAMINOPHEN 500 MG
1000 TABLET ORAL ONCE
Status: COMPLETED | OUTPATIENT
Start: 2023-05-05 | End: 2023-05-05

## 2023-05-05 RX ORDER — BUPIVACAINE HYDROCHLORIDE 5 MG/ML
INJECTION, SOLUTION PERINEURAL
Status: COMPLETED | OUTPATIENT
Start: 2023-05-05 | End: 2023-05-05

## 2023-05-05 RX ORDER — PROPOFOL 10 MG/ML
VIAL (ML) INTRAVENOUS AS NEEDED
Status: DISCONTINUED | OUTPATIENT
Start: 2023-05-05 | End: 2023-05-05 | Stop reason: SURG

## 2023-05-05 RX ORDER — MIDAZOLAM HYDROCHLORIDE 1 MG/ML
0.5 INJECTION INTRAMUSCULAR; INTRAVENOUS
Status: DISCONTINUED | OUTPATIENT
Start: 2023-05-05 | End: 2023-05-05 | Stop reason: HOSPADM

## 2023-05-05 RX ORDER — ROPIVACAINE HYDROCHLORIDE 5 MG/ML
INJECTION, SOLUTION EPIDURAL; INFILTRATION; PERINEURAL AS NEEDED
Status: DISCONTINUED | OUTPATIENT
Start: 2023-05-05 | End: 2023-05-05 | Stop reason: HOSPADM

## 2023-05-05 RX ORDER — MORPHINE SULFATE 4 MG/ML
4 INJECTION, SOLUTION INTRAMUSCULAR; INTRAVENOUS
Status: DISCONTINUED | OUTPATIENT
Start: 2023-05-05 | End: 2023-05-08 | Stop reason: HOSPADM

## 2023-05-05 RX ORDER — SODIUM CHLORIDE 9 MG/ML
120 INJECTION, SOLUTION INTRAVENOUS CONTINUOUS
Status: DISCONTINUED | OUTPATIENT
Start: 2023-05-05 | End: 2023-05-08

## 2023-05-05 RX ORDER — ONDANSETRON 2 MG/ML
4 INJECTION INTRAMUSCULAR; INTRAVENOUS EVERY 6 HOURS PRN
Status: DISCONTINUED | OUTPATIENT
Start: 2023-05-05 | End: 2023-05-08 | Stop reason: HOSPADM

## 2023-05-05 RX ORDER — MELOXICAM 15 MG/1
15 TABLET ORAL ONCE
Status: COMPLETED | OUTPATIENT
Start: 2023-05-05 | End: 2023-05-05

## 2023-05-05 RX ORDER — SODIUM CHLORIDE 9 MG/ML
40 INJECTION, SOLUTION INTRAVENOUS AS NEEDED
Status: CANCELLED | OUTPATIENT
Start: 2023-05-05

## 2023-05-05 RX ORDER — OXYCODONE HYDROCHLORIDE 5 MG/1
5 TABLET ORAL EVERY 4 HOURS PRN
Status: DISCONTINUED | OUTPATIENT
Start: 2023-05-05 | End: 2023-05-08 | Stop reason: HOSPADM

## 2023-05-05 RX ORDER — CEFAZOLIN SODIUM 2 G/100ML
2 INJECTION, SOLUTION INTRAVENOUS ONCE
Status: COMPLETED | OUTPATIENT
Start: 2023-05-05 | End: 2023-05-05

## 2023-05-05 RX ORDER — PREGABALIN 150 MG/1
150 CAPSULE ORAL ONCE
Status: COMPLETED | OUTPATIENT
Start: 2023-05-05 | End: 2023-05-05

## 2023-05-05 RX ORDER — NALOXONE HCL 0.4 MG/ML
0.1 VIAL (ML) INJECTION
Status: DISCONTINUED | OUTPATIENT
Start: 2023-05-05 | End: 2023-05-08 | Stop reason: HOSPADM

## 2023-05-05 RX ORDER — BUPIVACAINE HCL/0.9 % NACL/PF 0.125 %
PLASTIC BAG, INJECTION (ML) EPIDURAL AS NEEDED
Status: DISCONTINUED | OUTPATIENT
Start: 2023-05-05 | End: 2023-05-05 | Stop reason: SURG

## 2023-05-05 RX ORDER — LIDOCAINE HYDROCHLORIDE 10 MG/ML
0.5 INJECTION, SOLUTION EPIDURAL; INFILTRATION; INTRACAUDAL; PERINEURAL ONCE AS NEEDED
Status: COMPLETED | OUTPATIENT
Start: 2023-05-05 | End: 2023-05-05

## 2023-05-05 RX ORDER — SODIUM CHLORIDE 0.9 % (FLUSH) 0.9 %
10 SYRINGE (ML) INJECTION EVERY 12 HOURS SCHEDULED
Status: CANCELLED | OUTPATIENT
Start: 2023-05-05

## 2023-05-05 RX ORDER — ONDANSETRON 4 MG/1
4 TABLET, FILM COATED ORAL EVERY 6 HOURS PRN
Status: DISCONTINUED | OUTPATIENT
Start: 2023-05-05 | End: 2023-05-08 | Stop reason: HOSPADM

## 2023-05-05 RX ORDER — LABETALOL HYDROCHLORIDE 5 MG/ML
10 INJECTION, SOLUTION INTRAVENOUS EVERY 4 HOURS PRN
Status: DISCONTINUED | OUTPATIENT
Start: 2023-05-05 | End: 2023-05-08 | Stop reason: HOSPADM

## 2023-05-05 RX ORDER — MAGNESIUM HYDROXIDE 1200 MG/15ML
LIQUID ORAL AS NEEDED
Status: DISCONTINUED | OUTPATIENT
Start: 2023-05-05 | End: 2023-05-05 | Stop reason: HOSPADM

## 2023-05-05 RX ADMIN — PROPOFOL 40 MG: 10 INJECTION, EMULSION INTRAVENOUS at 10:53

## 2023-05-05 RX ADMIN — TRANEXAMIC ACID 1000 MG: 10 INJECTION, SOLUTION INTRAVENOUS at 12:28

## 2023-05-05 RX ADMIN — PROPOFOL 20 MG: 10 INJECTION, EMULSION INTRAVENOUS at 10:50

## 2023-05-05 RX ADMIN — Medication 100 MCG: at 11:25

## 2023-05-05 RX ADMIN — BUPIVACAINE HYDROCHLORIDE 2 ML: 5 INJECTION, SOLUTION PERINEURAL at 10:50

## 2023-05-05 RX ADMIN — FAMOTIDINE 20 MG: 20 TABLET ORAL at 09:04

## 2023-05-05 RX ADMIN — Medication 2.5 MG: at 20:04

## 2023-05-05 RX ADMIN — MELOXICAM 15 MG: 15 TABLET ORAL at 09:04

## 2023-05-05 RX ADMIN — SODIUM CHLORIDE, POTASSIUM CHLORIDE, SODIUM LACTATE AND CALCIUM CHLORIDE 9 ML/HR: 600; 310; 30; 20 INJECTION, SOLUTION INTRAVENOUS at 09:04

## 2023-05-05 RX ADMIN — Medication 200 MCG: at 12:06

## 2023-05-05 RX ADMIN — TRANEXAMIC ACID 1000 MG: 10 INJECTION, SOLUTION INTRAVENOUS at 10:58

## 2023-05-05 RX ADMIN — SODIUM CHLORIDE, POTASSIUM CHLORIDE, SODIUM LACTATE AND CALCIUM CHLORIDE: 600; 310; 30; 20 INJECTION, SOLUTION INTRAVENOUS at 12:45

## 2023-05-05 RX ADMIN — OXYCODONE HYDROCHLORIDE 5 MG: 5 TABLET ORAL at 20:04

## 2023-05-05 RX ADMIN — PROPOFOL 100 MCG/KG/MIN: 10 INJECTION, EMULSION INTRAVENOUS at 10:53

## 2023-05-05 RX ADMIN — Medication 100 MCG: at 11:20

## 2023-05-05 RX ADMIN — CEFAZOLIN SODIUM 2 G: 2 INJECTION, SOLUTION INTRAVENOUS at 17:49

## 2023-05-05 RX ADMIN — CEFAZOLIN SODIUM 2 G: 2 INJECTION, SOLUTION INTRAVENOUS at 10:55

## 2023-05-05 RX ADMIN — PHENYLEPHRINE HYDROCHLORIDE 0.5 MCG/KG/MIN: 10 INJECTION INTRAVENOUS at 12:09

## 2023-05-05 RX ADMIN — Medication 200 MCG: at 11:15

## 2023-05-05 RX ADMIN — Medication 10 ML: at 16:05

## 2023-05-05 RX ADMIN — PREGABALIN 150 MG: 150 CAPSULE ORAL at 09:04

## 2023-05-05 RX ADMIN — OXYCODONE HYDROCHLORIDE 5 MG: 5 TABLET ORAL at 16:04

## 2023-05-05 RX ADMIN — Medication 200 MCG: at 11:50

## 2023-05-05 RX ADMIN — LIDOCAINE HYDROCHLORIDE 0.5 ML: 10 INJECTION, SOLUTION EPIDURAL; INFILTRATION; INTRACAUDAL; PERINEURAL at 09:04

## 2023-05-05 RX ADMIN — SODIUM CHLORIDE 120 ML/HR: 0.9 INJECTION, SOLUTION INTRAVENOUS at 16:04

## 2023-05-05 RX ADMIN — ACETAMINOPHEN 1000 MG: 500 TABLET ORAL at 09:04

## 2023-05-05 RX ADMIN — Medication 100 MCG: at 11:57

## 2023-05-05 NOTE — CASE MANAGEMENT/SOCIAL WORK
Discharge Planning Assessment  [facility]     Patient Name: Janet Darden  MRN: [id number]  Today's Date: 5/5/2023    Admit Date: 5/5/2023    Plan: SNF   Discharge Needs Assessment     Row Name 05/05/23 1606       Living Environment    People in Home spouse;child(reece), adult    Current Living Arrangements home    Primary Care Provided by self;spouse/significant other;child(reece)    Provides Primary Care For no one, unable/limited ability to care for self    Family Caregiver if Needed spouse;child(reece), adult    Quality of Family Relationships helpful;involved;supportive    Able to Return to Prior Arrangements no    Living Arrangement Comments may need skilled rehab       Resource/Environmental Concerns    Resource/Environmental Concerns none       Transition Planning    Patient/Family Anticipates Transition to inpatient rehabilitation facility    Patient/Family Anticipated Services at Transition rehabilitation services    Transportation Anticipated family or friend will provide       Discharge Needs Assessment    Readmission Within the Last 30 Days no previous admission in last 30 days    Equipment Currently Used at Home walker, standard;wheelchair    Concerns to be Addressed adjustment to diagnosis/illness;basic needs;discharge planning    Anticipated Changes Related to Illness inability to care for self    Equipment Needed After Discharge walker, rolling    Outpatient/Agency/Support Group Needs skilled nursing facility    Discharge Facility/Level of Care Needs nursing facility, skilled    Current Discharge Risk physical impairment               Discharge Plan     Row Name 05/05/23 1608       Plan    Plan SNF    Plan Comments chart reviewed. I met with Mrs Darden and family at bedside to initiate d/c planning. She lives with her  who states he has limited ability to assist with her care. She states the past 2 weeks her mobility was very limited due to pain and she required a wheelchair. She and her  family would like her to go to short term inpt rehab. Lengthy discussion explaining acute vs skilled level of rehab. Her insurance will only cover the skilled level of rehab in a SNF. Options presented. She has requested a referral to [facility] ( [facility] I spoke with Nuria who accepted referral. Her insurance will require pre authorization, the earliest they can obtain this will be Monday(since insurance closed over WE). Case mgt will f/u Monday am    Final Discharge Disposition Code 03 - skilled nursing facility (SNF)              Continued Care and Services - Admitted Since 5/5/2023    Coordination has not been started for this encounter.          Demographic Summary     Row Name 05/05/23 1600       General Information    Admission Type observation    Arrived From home    Referral Source admission list    Reason for Consult discharge planning    Preferred Language English    General Information Comments PCP- Shaq Joya       Contact Information    Permission Granted to Share Info With ;family/designee               Functional Status     Row Name 05/05/23 1602       Functional Status    Usual Activity Tolerance poor    Current Activity Tolerance --  await PT eval noted    Functional Status Comments past 2 weeks mobility very limited due to pain, required a wheelchair       Functional Status, IADL    Medications independent    Meal Preparation assistive person    Housekeeping assistive person    Laundry assistive person    Shopping assistive person       Mental Status    General Appearance WDL WDL       Mental Status Summary    Recent Changes in Mental Status/Cognitive Functioning no changes       Employment/    Employment Status retired    Employment/ Comments insurance- [employer]               Psychosocial    No documentation.                Abuse/Neglect    No documentation.                Legal    No documentation.                Substance Abuse    No  documentation.                Patient Forms    No documentation.                   Sonja C Kellerman, RN

## 2023-05-05 NOTE — INTERVAL H&P NOTE
[facility] Pre-op    Full history and physical note from office is attached.    /67 (BP Location: Right arm, Patient Position: Lying)   Pulse 95   Temp 98.5 °F (36.9 °C) (Temporal)   Resp 18   SpO2 96%     Immunizations:  Influenza:  No  Pneumococcal:  No  Tetanus:  No  Covid : No      LAB Results:  Lab Results   Component Value Date    WBC 10.40 04/26/2023    HGB 13.6 04/26/2023    HCT 41.0 04/26/2023    MCV 92.3 04/26/2023     04/26/2023    NEUTROABS 5.80 04/26/2023    GLUCOSE 135 (H) 04/26/2023    BUN 32 (H) 04/26/2023    CREATININE 0.84 04/26/2023     04/26/2023    K 4.0 04/26/2023     04/26/2023    CO2 23.0 04/26/2023    CALCIUM 10.2 04/26/2023    PTT 25.0 04/26/2023    INR 0.95 04/26/2023 4/19/23 xray:  Study Result    Narrative & Impression   Left Hip Radiographs  Indication: left hip pain  Views: low AP pelvis and lateral of the left hip     Comparison: no prior studies available for review     Findings:   Collapse of the femoral head, with no acute bony abnormalities.  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       Cancer Staging (if applicable)  Cancer Patient: __ yes __no __unknown__N/A; If yes, clinical stage T:__ N:__M:__, stage group or __N/A      Impression: Primary osteoarthritis of left hip      Plan: MODIFIED ANTERIOR TOTAL HIP ARTHROPLASTY - LEFT      Franci MARANDA Berrios   5/5/2023   08:55 EDT     Agree with above.  Plan for left total hip arthroplasty.    Ezekiel Rodriguez MD  05/05/23  10:10 EDT

## 2023-05-05 NOTE — ANESTHESIA POSTPROCEDURE EVALUATION
Patient: Janet Darden    Procedure Summary     Date: 05/05/23 Room / Location:  [facility]    Anesthesia Start: 1044 Anesthesia Stop: 1311    Procedure: MODIFIED ANTERIOR TOTAL HIP ARTHROPLASTY - LEFT (Left: Hip) Diagnosis:       Primary osteoarthritis of left hip      (Primary osteoarthritis of left hip [M16.12])    Surgeons: Ezekiel Rodriguez MD Provider: Thomas Armstrong Jr., MD    Anesthesia Type: spinal ASA Status: 2          Anesthesia Type: spinal    Vitals  No vitals data found for the desired time range.          Post Anesthesia Care and Evaluation    Patient location during evaluation: PACU  Patient participation: complete - patient participated  Level of consciousness: awake and alert  Pain score: 0  Pain management: adequate    Airway patency: patent  Anesthetic complications: No anesthetic complications  PONV Status: none  Cardiovascular status: hemodynamically stable and acceptable  Respiratory status: nonlabored ventilation, acceptable and room air  Hydration status: acceptable

## 2023-05-05 NOTE — OP NOTE
DATE OF PROCEDURE:  05/05/23    PREOPERATIVE DIAGNOSIS: left hip arthritis    POSTOPERATIVE DIAGNOSIS: left hip arthritis    PROCEDURE PERFORMED: left total hip arthroplasty with [employer]hew components, anterior modified Villa-Ritter approach    Surgical Approach: Anterior, modified Villa-Ritter approach    IMPLANTS: #52 press-fit R3 cup, 25 screw, neutral polyethylene liner, #2 standard offset press-fit Polar stem, +0 x 36 Bio-lox ceramic head    SURGEON: Ezekiel Rodriguez MD    ASSISTANT: Ashleigh Moctezuma PA-C  (Ashleigh Moctezuma PA-C was present and necessary for positioning, draping, retraction, instrumentation and closure.)    SPECIMENS: None    IMPLANTS:   Implant Name Type Inv. Item Serial No.  Lot No. LRB No. Used Action   DEV CONTRL TISS STRATAFIX SPIRAL MNCRYL UD 3/0 PLS 60CM - [id number] Implant DEV CONTRL TISS STRATAFIX SPIRAL MNCRYL UD 3/0 PLS 60CM  [employer]  DIV [employer] J [employer] J [id number] Left 1 Implanted   DEV CONTRL TISS STRATAFIX SYMM PDS PLUS EVA CT-1 45CM - [id number] Implant DEV CONTRL TISS STRATAFIX SYMM PDS PLUS EVA CT-1 45CM  [employer]  DIV OF J AND J [id number] Left 1 Implanted   SHLL ACET R3 3H STD 52MM - [id number] Implant SHLL ACET R3 3H STD 52MM  [employer] [id number] Left 1 Implanted   SCRW SPH HD REFLECTION 6.5X25MM - [id number] Implant SCRW SPH HD REFLECTION 6.5X25MM  [employer] [id number] Left 1 Implanted   LINER ACET R3 XLPE 0D 27G51TE - [id number] Implant LINER ACET R3 XLPE 0D [id number]  [employer] [id number] Left 1 Implanted   STEM FEM/HIP POLARSTEM W/COLR STD SZ2 - [id number] Implant STEM FEM/HIP POLARSTEM W/COLR STD SZ2  [employer] [id number] Left 1 Implanted   HD FEM/HIP BIOLOXDELTA R3 12/14 SM 36MM PLS0 - [id number] Implant HD FEM/HIP BIOLOXDELTA R3 12/14 SM 36MM PLS0  [employer] [id number] Left 1 Implanted         ANESTHESIA:  Spinal    STAFF:  Circulator: Gail Hendricks RN; Jenn Mckeon RN; Jayme Georges RN  Radiology Technologist:  Marko Rod, RT; Nancy Iqbal R.T.(R)  Scrub Person: Eryn Santiago RN  Vendor Representative: Arie Celestin ([employer])  Nursing Assistant: Bernard Hernandez PCT  Assistant: Ashleigh Moctezuma PA-C    ESTIMATED BLOOD LOSS: 125 mL     COMPLICATIONS: None    PREOPERATIVE ANTIBIOTICS: Ancef 2 g    INDICATIONS: The patient is a 77 y.o. female with a history of debilitating left hip pain secondary to arthritis, with a likely avascular component, that failed to improve in spite of conservative treatment. The patient opted for a left total hip arthroplasty at this time and consented for the procedure. Please see my office notes for details with regard to preoperative counseling and operative rationale.     DESCRIPTION OF PROCEDURE: The patient was positively identified in the preoperative holding area, brought to the operative suite, and placed in a supine position. After adequate spinal anesthetic had been achieved, the patient was placed in the supine position with a well padded folded blanket bolster under the left flank area, leaving the buttock free. After sterile prep and drape of the left hip and lower extremity, as well as draping the non-operative extremity to allow access for limb length assessment, a timeout procedure was performed to confirm the operative site, as well as the other parameters.     After placing a bump under the knee, a skin incision was made over the lateral border of the tensor fascia latae from the level of the anterior superior iliac spine distally on the anterior aspect of the hip for a modified Villa-Ritter approach. Following a sharp skin incision, dissection was carried down to the level of the fascia, ensuring clear identification of the interval between the tensor and the fascia laterally.  Fascia was then incised from proximal to distal, leaving a good cuff of tissue for later repair, then working form distal to proximal perforating vessels were identified and  "cauterized, including the perforating vessels along the anterior edge of the gluteus medius.  With the anterior edge of the gluteus medius identified, a Cobra retractor was placed on the capsule over the superior aspect of the femoral neck.  After identifying the superior edge of the vastus lateralis distally, a second Cobra retractor was placed over the capsule overlying the inferior femoral neck.  The reflected head of the rectus femoris was identified, and the fascia released enough to allow placement of a single prong acetabular retractor. The knee bump was then removed, leg externally rotated, and anterior capsule excised first laterally then medially, and the labrum incised superiorly.  Cobra retractors were repositioned on the exposed femoral neck both superiorly and inferiorly.  Description of femoral head: Flattening, disintegration of the femoral head and several portions of the head dissociated from the native head and removed from the inferior aspect of the acetabulum, with advanced degeneration and collapse.  A neck cut was then made at the head and neck junction with the aid of an oscillating saw blade, followed by a second cut at the base of the femoral neck.  The \"napkin ring\" femoral neck fragment was removed, as well as the femoral head after repositioning the posterior Cobra retractor just outside the hip capsule.    Acetabular exposure was then addressed by repositioning the anterior Cobra retractor between the capsule and the psoas tendon.  The labrum was then removed from the rim of the acetabulum anteriorly, superiorly and posteriorly, preserving the transverse acetabular ligament. The anterior Cobra retractor was replaced over the transverse acetabular ligament, and a Nix retractor placed over the posterior wall of the acetabulum.  Description of acetabulum: Advanced degeneration circumferentially, with thickening of the capsule and labrum. With the transverse acetabular ligament as a " reference for cup positioning, the acetabulum was sequentially reamed up to 52 to accommodate a 52 press-fit R3 cup, which had excellent press-fit characteristics.  A single 25 mm screw was placed which had excellent purchase, followed by a neutral polyethylene liner.    Attention was then redirected towards the femoral aspect. The non-operative limb was then placed on a padded Glover stand, to allow for appropriate positioning of the operative limb.  Using the bone hook for traction in the calcar region of the proximal femur, the posterior capsule was released adjacent to the greater trochanter to allow for delivery of the proximal femur with a double fang retractor.  With appropriate external rotation of the proximal femur and further adduction of the leg following double fang retractor placement and removal of the single-toothed fang anteriorly, a Nix retractor was placed in the region of the calcar and femoral preparations were made to accommodate the polar stem.  Box osteotome was used to prepare the lateral aspect, followed by the T-handle canal finder, then sequential broaching of the femur to accommodate a #2 broach, standard offset neck, with a +0 x 36 head.      Trial reduction was performed, full arc of motion noted, with appropriate limb lengths after leveling the table.  Intraoperative fluoroscopy showed appropriate implant alignment and leg lengths.  The hip was again dislocated and the final #2 standard offset press-fit Polar stem placed, followed by +0 x 36 ceramic head, with the same reduction characteristics were noted as with the trial with no dislocation throughout the full arc of motion and appropriate limb lengths.      Therefore, the hip was copiously irrigated and attention directed towards closure. Fascia latae was closed with #1 Vicryl in an interrupted figure-of-eight fashion in 3 strategic locations both proximally, distally and in the central portion, followed by oversewing this from  distal to proximal with a #1 StrataFix symmetric, which nicely sealed that layer, followed by closure of the subcutaneous layer with 2-0 Vicryl and the skin with 3-0 StrataFix in a running subcuticular fashion.  Adhesive wound closure dressing was applied followed by a sterile dressing with 4 x 4s secured with micropore tape.  The patient tolerated the procedure well and was brought to the recovery room in good condition.     POSTOPERATIVE PLAN:  1. The patient will begin early range of motion and weight-bearing per the post anterior total hip arthroplasty protocol.   2. I anticipate brief hospitalization for initial rehabilitation and pain control followed by continued rehabilitation home health/outpatient physical therapy setting.  Patient likely ready for discharge either later today or tomorrow as long as she is cleared by therapy and medically.  Follow-up in 3 weeks as planned.   3. Postoperative medical management with Dr. Valencia.  4. Postoperative DVT prophylaxis with aspirin.  5. Postoperative IV antibiotics with Ancef.      Ezekiel Rodriguez MD  05/05/23  13:11 EDT

## 2023-05-05 NOTE — ANESTHESIA PREPROCEDURE EVALUATION
Anesthesia Evaluation     Patient summary reviewed and Nursing notes reviewed   no history of anesthetic complications:  NPO Solid Status: > 8 hours  NPO Liquid Status: > 2 hours           Airway   Mallampati: I  TM distance: >3 FB  No difficulty expected  Dental - normal exam     Pulmonary - normal exam   (-) COPD, asthma, sleep apnea, not a smoker  Cardiovascular - normal exam    ECG reviewed    (+) hypertension,   (-) dysrhythmias, angina, orthopnea, cardiac stents    ROS comment: RBB on ECG    Neuro/Psych  (-) seizures, CVA  GI/Hepatic/Renal/Endo    (-) liver disease, no renal disease, diabetes, no thyroid disorder    Musculoskeletal     Abdominal    Substance History      OB/GYN          Other   arthritis,                      Anesthesia Plan    ASA 2     spinal       Anesthetic plan, risks, benefits, and alternatives have been provided, discussed and informed consent has been obtained with: patient.    Use of blood products discussed with consented to blood products.   Plan discussed with CRNA.        CODE STATUS:

## 2023-05-05 NOTE — TELEPHONE ENCOUNTER
Pt's son mil nunn came into office    He faxed in [place] paperwork for hiumself to be off to care for patient his mother.    The paperwork was rejected for two reasons. The son has requested intermittent leave and dr freire wrote for continuous leave.    Also   
[id number]

## 2023-05-05 NOTE — PLAN OF CARE
Goal Outcome Evaluation:  Plan of Care Reviewed With: patient, daughter, spouse, son        Progress: no change  Outcome Evaluation: PT initial eval completed. Pt s/p ARNOLD 5/5 and presents with decreased strength, impaired balance, increased pain and poor endurance causing functional mobility below baseline. Pt ambulated 60 with min Ax1+1 for close chair follow and FWW. Gait mechanics improved with cues. No LOB. HEP completed. Pt will benefit from skilled IPPT for addressing deficits. PT rec d/c to IRF for best outcomes.

## 2023-05-05 NOTE — ANESTHESIA PROCEDURE NOTES
Spinal Block    Pre-sedation assessment completed: 5/5/2023 10:35 AM    Patient reassessed immediately prior to procedure    Patient location during procedure: OR  Indication:at surgeon's request, post-op pain management and procedure for pain  Performed ByELSY: Radha Torrez SRNA  Preanesthetic Checklist  Completed: patient identified, IV checked, site marked, risks and benefits discussed, surgical consent, monitors and equipment checked, pre-op evaluation and timeout performed  Spinal Block Prep:  Patient Position:sitting  Sterile Tech:cap, gloves, mask and sterile barriers  Prep:Chloraprep  Patient Monitoring:continuous pulse oximetry    Spinal Block Procedure  Approach:midline  Guidance:landmark technique and palpation technique  Location:L4-L5  Needle Type:Dolly  Needle Gauge:25 G  Placement of Spinal needle event:cerebrospinal fluid aspirated  Paresthesia: no  Fluid Appearance:clear  Medications: bupivacaine (MARCAINE) 0.5 % injection - Injection   2 mL - 5/5/2023 10:50:00 AM   Post Assessment  Patient Tolerance:patient tolerated the procedure well with no apparent complications  Complications no  Additional Notes  Student attempt x 1, pt tolerated well

## 2023-05-05 NOTE — THERAPY EVALUATION
Patient Name: Janet Darden  : 1945    MRN: [id number]                              Today's Date: 2023       Admit Date: 2023    Visit Dx:     ICD-10-CM ICD-9-CM   1. Primary osteoarthritis of left hip  M16.12 715.15     Patient Active Problem List   Diagnosis   • Primary osteoarthritis of left hip   • HTN (hypertension)   • S/P total left hip arthroplasty     Past Medical History:   Diagnosis Date   • Arthritis    • Hypertension    • Osteoporosis      Past Surgical History:   Procedure Laterality Date   • CHOLECYSTECTOMY     • COLONOSCOPY     • HYSTERECTOMY     • MOUTH SURGERY      jaw surgery      General Information     Row Name 23 1633          Physical Therapy Time and Intention    Document Type therapy note (daily note)  -AB     Mode of Treatment physical therapy  -AB     Row Name 23 1633          General Information    Patient Profile Reviewed yes  -AB     Prior Level of Function min assist:;transfer;bed mobility;ADL's;mod assist:;all household mobility;community mobility;gait  Pt reports steady decline over past several months. Progressed from using cane>RW>w/c for last month.  -AB     Existing Precautions/Restrictions fall;hip, anterior;left  -AB     Barriers to Rehab previous functional deficit  -AB     Row Name 23 1633          Living Environment    People in Home spouse;child(reece), adult  -AB     Row Name 23 1633          Home Main Entrance    Number of Stairs, Main Entrance one  -AB     Stair Railings, Main Entrance none  -AB     Row Name 23 1633          Stairs Within Home, Primary    Number of Stairs, Within Home, Primary none  -AB     Row Name 23 1633          Cognition    Orientation Status (Cognition) oriented x 3  -AB     Row Name 23 1633          Safety Issues, Functional Mobility    Safety Issues Affecting Function (Mobility) insight into deficits/self-awareness;awareness of need for assistance;safety precaution  awareness;safety precautions follow-through/compliance;sequencing abilities;positioning of assistive device  -AB     Impairments Affecting Function (Mobility) balance;endurance/activity tolerance;pain;range of motion (ROM);shortness of breath;strength  -AB           User Key  (r) = Recorded By, (t) = Taken By, (c) = Cosigned By    Initials Name Provider Type    AB Minnie Mccormick, PT Physical Therapist               Mobility     Row Name 05/05/23 1635          Bed Mobility    Bed Mobility supine-sit;scooting/bridging  -AB     Scooting/Bridging [place] (Bed Mobility) 1 person assist;verbal cues;minimum assist (75% patient effort)  -AB     Supine-Sit [place] (Bed Mobility) verbal cues;1 person assist;moderate assist (50% patient effort)  -AB     Assistive Device (Bed Mobility) head of bed elevated;draw sheet  -AB     Comment, (Bed Mobility) Assist for BLE management and boost at trunk to sit EOB.  -AB     Row Name 05/05/23 1635          Transfers    Comment, (Transfers) Cues for sequencing, hand placement, and walker management. Pt with increased pain during movement.  -AB     Row Name 05/05/23 1635          Bed-Chair Transfer    Bed-Chair [place] (Transfers) minimum assist (75% patient effort);2 person assist;verbal cues;nonverbal cues (demo/gesture)  -AB     Assistive Device (Bed-Chair Transfers) walker, front-wheeled  -AB     Row Name 05/05/23 1635          Sit-Stand Transfer    Sit-Stand [place] (Transfers) verbal cues;nonverbal cues (demo/gesture);minimum assist (75% patient effort);2 person assist  -AB     Assistive Device (Sit-Stand Transfers) walker, front-wheeled  -AB     Comment, (Sit-Stand Transfer) Boost to stand. STS from multiple surface heights.  -AB     Row Name 05/05/23 1635          Gait/Stairs (Locomotion)    [place] Level (Gait) minimum assist (75% patient effort);verbal cues;nonverbal cues (demo/gesture);1 person assist;1 person to manage equipment  -AB     Assistive  Device (Gait) walker, front-wheeled  -AB     Ambulated day of surgery or within 4 hours of PACU discharge yes  -AB     Distance in Feet (Gait) 60  -AB     Deviations/Abnormal Patterns (Gait) bilateral deviations;base of support, wide;wan decreased;gait speed decreased;stride length decreased;steppage  -AB     Bilateral Gait Deviations forward flexed posture;heel strike decreased  -AB     Left Sided Gait Deviations weight shift ability decreased  -AB     [place] Level (Stairs) unable to assess  -AB     Comment, (Gait/Stairs) Pt demo's step to gait pattern with decreased stride length and weight acceptance onto LLE. Cues for lowering shoulders and increased step length. Gait mechanics improved with cues. No LOB but pt quick to fatigue. Further gait limited by pain and fatigue.  -AB           User Key  (r) = Recorded By, (t) = Taken By, (c) = Cosigned By    Initials Name Provider Type    AB Minnie Mccormick, PT Physical Therapist               Obj/Interventions     Row Name 05/05/23 1638          Range of Motion Comprehensive    General Range of Motion bilateral lower extremity ROM WFL  -AB     Row Name 05/05/23 1638          Strength Comprehensive (MMT)    General Manual Muscle Testing (MMT) Assessment lower extremity strength deficits identified  -AB     Comment, General Manual Muscle Testing (MMT) Assessment BLE strength grossly 4-/5  -AB     Row Name 05/05/23 1638          Motor Skills    Therapeutic Exercise hip;knee;ankle  -AB     Row Name 05/05/23 1638          Hip (Therapeutic Exercise)    Hip (Therapeutic Exercise) strengthening exercise  -AB     Hip Strengthening (Therapeutic Exercise) aBduction;left;aDduction;heel slides;3 repetitions  -AB     Row Name 05/05/23 1638          Knee (Therapeutic Exercise)    Knee (Therapeutic Exercise) isometric exercises;strengthening exercise  -AB     Knee Isometrics (Therapeutic Exercise) left;quad sets;10 repetitions  -AB     Knee Strengthening (Therapeutic  Exercise) left;SLR (straight leg raise);3 repetitions  -AB     Row Name 05/05/23 1638          Ankle (Therapeutic Exercise)    Ankle (Therapeutic Exercise) AROM (active range of motion)  -AB     Ankle AROM (Therapeutic Exercise) bilateral;dorsiflexion;plantarflexion;10 repetitions  -AB     Row Name 05/05/23 1638          Balance    Balance Assessment sitting static balance;standing static balance;standing dynamic balance;sitting dynamic balance  -AB     Static Sitting Balance contact guard  -AB     Dynamic Sitting Balance contact guard  -AB     Position, Sitting Balance unsupported;sitting edge of bed  -AB     Static Standing Balance minimal assist;1-person assist;verbal cues  -AB     Dynamic Standing Balance minimal assist;1-person assist;1 person to manage equipment;verbal cues  -AB     Position/Device Used, Standing Balance supported;walker, front-wheeled  -AB     Balance Interventions sitting;standing;sit to stand;supported;static;dynamic;occupation based/functional task  -AB     Comment, Balance No overt LOB.  -AB     Row Name 05/05/23 1638          Sensory Assessment (Somatosensory)    Sensory Assessment (Somatosensory) LE sensation intact  -AB           User Key  (r) = Recorded By, (t) = Taken By, (c) = Cosigned By    Initials Name Provider Type    AB Minnie Mccormick, PT Physical Therapist               Goals/Plan     Row Name 05/05/23 1642          Bed Mobility Goal 1 (PT)    Activity/Assistive Device (Bed Mobility Goal 1, PT) sit to supine;supine to sit  -AB     [place] Level/Cues Needed (Bed Mobility Goal 1, PT) independent  -AB     Time Frame (Bed Mobility Goal 1, PT) long term goal (LTG);10 days  -AB     Row Name 05/05/23 1642          Transfer Goal 1 (PT)    Activity/Assistive Device (Transfer Goal 1, PT) sit-to-stand/stand-to-sit;bed-to-chair/chair-to-bed;walker, rolling  -AB     [place] Level/Cues Needed (Transfer Goal 1, PT) modified independence  -AB     Time Frame (Transfer Goal 1, PT)  long term goal (LTG);10 days  -AB     Row Name 05/05/23 1642          Gait Training Goal 1 (PT)    Activity/Assistive Device (Gait Training Goal 1, PT) gait (walking locomotion);assistive device use;walker, rolling  -AB     [place] Level (Gait Training Goal 1, PT) modified independence  -AB     Distance (Gait Training Goal 1, PT) 150  -AB     Time Frame (Gait Training Goal 1, PT) long term goal (LTG);10 days  -AB     Row Name 05/05/23 1642          Stairs Goal 1 (PT)    Activity/Assistive Device (Stairs Goal 1, PT) ascending stairs;descending stairs;walker, rolling  -AB     [place] Level/Cues Needed (Stairs Goal 1, PT) contact guard required  -AB     Number of Stairs (Stairs Goal 1, PT) 1  -AB     Time Frame (Stairs Goal 1, PT) long term goal (LTG);10 days  -AB     Row Name 05/05/23 1642          Therapy Assessment/Plan (PT)    Planned Therapy Interventions (PT) balance training;bed mobility training;gait training;home exercise program;postural re-education;patient/family education;ROM (range of motion);stair training;strengthening;stretching;transfer training  -AB           User Key  (r) = Recorded By, (t) = Taken By, (c) = Cosigned By    Initials Name Provider Type    AB Minnie Mccormick, PT Physical Therapist               Clinical Impression     Row Name 05/05/23 1639          Pain    Pretreatment Pain Rating 6/10  -AB     Posttreatment Pain Rating 6/10  -AB     Pain Location - Side/Orientation Right  -AB     Pain Location generalized  -AB     Pain Location - hip  -AB     Pre/Posttreatment Pain Comment Pt with increased pain during mobility. RN managing.  -AB     Pain Intervention(s) Ambulation/increased activity;Elevated;Rest;Repositioned;Nursing Notified  -AB     Additional Documentation Pain Scale: Numbers Pre/Post-Treatment (Group)  -AB     Row Name 05/05/23 1639          Plan of Care Review    Plan of Care Reviewed With patient;daughter;spouse;son  -AB     Progress no change  -AB     Outcome  Evaluation PT initial eval completed. Pt s/p ARNOLD 5/5 and presents with decreased strength, impaired balance, increased pain and poor endurance causing functional mobility below baseline. Pt ambulated 60 with min Ax1+1 for close chair follow and FWW. Gait mechanics improved with cues. No LOB. HEP completed. Pt will benefit from skilled IPPT for addressing deficits. PT rec d/c to IRF for best outcomes.  -AB     Row Name 05/05/23 1639          Therapy Assessment/Plan (PT)    Rehab Potential (PT) good, to achieve stated therapy goals  -AB     Criteria for Skilled Interventions Met (PT) yes;meets criteria;skilled treatment is necessary  -AB     Therapy Frequency (PT) 2 times/day  -AB     Row Name 05/05/23 1639          Vital Signs    Pre Systolic BP Rehab 140  -AB     Pre Treatment Diastolic BP 91  -AB     Post Systolic BP Rehab 151  -AB     Post Treatment Diastolic BP 69  -AB     Pretreatment Heart Rate (beats/min) 80  -AB     Posttreatment Heart Rate (beats/min) 86  -AB     Pre SpO2 (%) 98  -AB     O2 Delivery Pre Treatment room air  -AB     O2 Delivery Intra Treatment room air  -AB     Post SpO2 (%) 94  -AB     O2 Delivery Post Treatment room air  -AB     Pre Patient Position Supine  -AB     Intra Patient Position Standing  -AB     Post Patient Position Sitting  -AB     Row Name 05/05/23 1639          Positioning and Restraints    Pre-Treatment Position in bed  -AB     Post Treatment Position chair  -AB     In Chair notified nsg;reclined;sitting;call light within reach;encouraged to call for assist;exit alarm on;with family/caregiver;legs elevated;waffle cushion;heels elevated  -AB           User Key  (r) = Recorded By, (t) = Taken By, (c) = Cosigned By    Initials Name Provider Type    AB Minnie Mccormick, PT Physical Therapist               Outcome Measures     Row Name 05/05/23 1643          How much help from another person do you currently need...    Turning from your back to your side while in flat bed without  using bedrails? 3  -AB     Moving from lying on back to sitting on the side of a flat bed without bedrails? 2  -AB     Moving to and from a bed to a chair (including a wheelchair)? 3  -AB     Standing up from a chair using your arms (e.g., wheelchair, bedside chair)? 3  -AB     Climbing 3-5 steps with a railing? 1  -AB     To walk in hospital room? 3  -AB     AM-PAC 6 Clicks Score (PT) 15  -AB     Highest level of mobility 4 --> Transferred to chair/commode  -AB     Row Name 05/05/23 1643          PADD    Diagnosis 2  -AB     Gender 1  -AB     Age Group 1  -AB     Gait Distance 0  -AB     Assist Level 0  -AB     Home Support 3  -AB     PADD Score 7  -AB     Patient Preference extended rehabilitation  -AB     Prediction by PADD Score extended rehabilitation  -AB     Row Name 05/05/23 1643          Functional Assessment    Outcome Measure Options AM-PAC 6 Clicks Basic Mobility (PT);PADD  -AB           User Key  (r) = Recorded By, (t) = Taken By, (c) = Cosigned By    Initials Name Provider Type    AB Minnie Mccormick, PT Physical Therapist                             Physical Therapy Education     Title: PT OT SLP Therapies (Done)     Topic: Physical Therapy (Done)     Point: Mobility training (Done)     Learning Progress Summary           Patient Acceptance, E,D, VU,NR by AB at 5/5/2023 1644   Family Acceptance, E,D, VU,NR by AB at 5/5/2023 1644                   Point: Home exercise program (Done)     Learning Progress Summary           Patient Acceptance, E,D, VU,NR by AB at 5/5/2023 1644   Family Acceptance, E,D, VU,NR by AB at 5/5/2023 1644                   Point: Body mechanics (Done)     Learning Progress Summary           Patient Acceptance, E,D, VU,NR by AB at 5/5/2023 1644   Family Acceptance, E,D, VU,NR by AB at 5/5/2023 1644                   Point: Precautions (Done)     Learning Progress Summary           Patient Acceptance, E,D, VU,NR by AB at 5/5/2023 1644   Family Acceptance, E,D, VU,NR by AB at  5/5/2023 1644                               User Key     Initials Effective Dates Name Provider Type Discipline    AB 09/22/22 -  Minnie Mccormick PT Physical Therapist PT              PT Recommendation and Plan  Planned Therapy Interventions (PT): balance training, bed mobility training, gait training, home exercise program, postural re-education, patient/family education, ROM (range of motion), stair training, strengthening, stretching, transfer training  Plan of Care Reviewed With: patient, daughter, spouse, son  Progress: no change  Outcome Evaluation: PT initial eval completed. Pt s/p ARNOLD 5/5 and presents with decreased strength, impaired balance, increased pain and poor endurance causing functional mobility below baseline. Pt ambulated 60 with min Ax1+1 for close chair follow and FWW. Gait mechanics improved with cues. No LOB. HEP completed. Pt will benefit from skilled IPPT for addressing deficits. PT rec d/c to IRF for best outcomes.     Time Calculation:    PT Charges     Row Name 05/05/23 1645             Time Calculation    Start Time 1541  -AB      PT Received On 05/05/23  -AB      PT Goal Re-Cert Due Date 05/15/23  -AB         Timed Charges    86580 - PT Therapeutic Exercise Minutes 12  -AB         Untimed Charges    PT Eval/Re-eval Minutes 50  -AB         Total Minutes    Timed Charges Total Minutes 12  -AB      Untimed Charges Total Minutes 50  -AB       Total Minutes 62  -AB            User Key  (r) = Recorded By, (t) = Taken By, (c) = Cosigned By    Initials Name Provider Type    AB Minnie Mccormick, PT Physical Therapist              Therapy Charges for Today     Code Description Service Date Service Provider Modifiers Qty    68976892354 HC PT THER PROC EA 15 MIN 5/5/2023 Minnie Mccormick, PT GP 1    48168868150 HC PT EVAL LOW COMPLEXITY 4 5/5/2023 Minnie Mccormick, PT GP 1          PT G-Codes  Outcome Measure Options: AM-PAC 6 Clicks Basic Mobility (PT), PADD  AM-PAC 6 Clicks Score (PT): 15  PT  Discharge Summary  Anticipated Discharge Disposition (PT): inpatient rehabilitation facility    Minnie Mccormick, PT  5/5/2023

## 2023-05-05 NOTE — H&P
Patient Name: Janet Darden  MRN: [id number]  : 1945  DOS: 2023    Attending: Ezekiel Rodriguez MD    Primary Care Provider: Shaq Joya MD      Chief complaint: Left hip pain    Subjective   Patient is a pleasant 77 y.o. female presented for scheduled surgery by Dr. Rodriguez.    Patient has had left hip pain, this has been present for several years, has been quite severe and has not responded to conservative management.    I saw her preoperatively she is doing fairly well, we reviewed her home medications and past medical history.    Subsequent notes indicate she underwent left total hip arthroplasty, anterior modified by Dr. Rodriguez.  Surgery was done under spinal anesthesia, was tolerated well, she was admitted for further management.       Allergies   Allergen Reactions   • Prednisone Other (See Comments)     Causes insomnia          Medications Prior to Admission   Medication Sig Dispense Refill Last Dose   • acetaminophen (TYLENOL) 500 MG tablet Take 1 tablet by mouth Every 6 (Six) Hours As Needed for Mild Pain.   2023 at 2200   • amLODIPine (NORVASC) 5 MG tablet Take 1 tablet by mouth Daily.   2023 at 0630   • Ascorbic Acid (VITAMIN C PO) Take 1,000 mg by mouth. Patient been holding since 23   Past Month   • Calcium Carbonate-Vitamin D (CALCIUM 600+D PO) Take  by mouth Daily. Patient been holding since 23.   Past Month   • celecoxib (CeleBREX) 50 MG capsule Take 1 capsule by mouth 2 (Two) Times a Day.   2023 at 2300   • cetirizine (zyrTEC) 10 MG tablet Take 1 tablet by mouth Daily.   2023 at 2200   • Chlorhexidine Gluconate 4 % solution Apply  topically to the appropriate area as directed Daily. Shower with hibiclens solution as directed for 5 days prior to surgery 237 mL 0 2023   • ibuprofen (ADVIL,MOTRIN) 200 MG tablet Take  by mouth Every 6 (Six) Hours As Needed for Mild Pain. Takes 600mg every 6 hours   Past Month   • melatonin 5 MG tablet tablet Take 2.5 mg by  mouth Every Night.   5/4/2023 at 2200   • traMADol (ULTRAM) 50 MG tablet Take 1 tablet by mouth Every 8 (Eight) Hours As Needed for Moderate Pain. 30 tablet 0 Past Week   • triamterene-hydrochlorothiazide (DYAZIDE) 37.5-25 MG per capsule Take 1 capsule by mouth Daily.   Past Month   • Zinc 50 MG capsule Take  by mouth. Patient been holding since 4/12/23   Past Month         History:   Past Medical History:   Diagnosis Date   • Arthritis    • Hypertension    • Osteoporosis      Past Surgical History:   Procedure Laterality Date   • CHOLECYSTECTOMY  1970   • COLONOSCOPY     • HYSTERECTOMY  1994   • MOUTH SURGERY  1987    jaw surgery     Family History   Problem Relation Age of Onset   • Cancer Mother    • Cancer Father      Social History     Tobacco Use   • Smoking status: Never   • Smokeless tobacco: Never   Vaping Use   • Vaping Use: Never used   Substance Use Topics   • Alcohol use: Never   • Drug use: Never       Review of Systems  Pertinent items are noted in HPI, all other systems reviewed and negative    Vital Signs  /91 (BP Location: Right arm, Patient Position: Lying)   Pulse 78   Temp 97.5 °F (36.4 °C) (Temporal)   Resp 14   SpO2 94%     Physical Exam:    General Appearance:    Alert, cooperative, in no acute distress   Head:    Normocephalic, without obvious abnormality, atraumatic   Eyes:            Lids and lashes normal, conjunctivae and sclerae normal, no   icterus, no pallor, corneas clear    Ears:    Ears appear intact with no abnormalities noted   Throat:   No oral lesions, no thrush, oral mucosa moist   Neck:   No adenopathy, supple, trachea midline, no thyromegaly         Lungs:     Clear to auscultation,respirations regular, even and   unlabored. No wheezes or rales.    Heart:    Regular rhythm and normal rate, normal S1 and S2, no murmur, no gallop   Abdomen:     Normal bowel sounds, no masses, no organomegaly, soft        non-tender, non-distended, no guarding, no rebound                  tenderness   Genitalia:    Deferred   Extremities:  No clubbing, cyanosis, or edema.   Pulses:   Pulses palpable and equal bilaterally   Skin:   No bleeding, bruising or rash   Neurologic:   Cranial nerves 2 - 12 grossly intact, Flexion and dorsiflexion intact bilateral feet.        I reviewed the patient's new clinical results.             Invalid input(s): NEUTOPHILPCT,  EOSPCT        Invalid input(s): LABALBU, PROT  Lab Results   Component Value Date    HGBA1C 5.80 (H) 04/26/2023      Latest Reference Range & Units 04/26/23 15:41   Glucose 65 - 99 mg/dL 135 (H)   Sodium 136 - 145 mmol/L 138   Potassium 3.5 - 5.2 mmol/L 4.0   CO2 22.0 - 29.0 mmol/L 23.0   Chloride 98 - 107 mmol/L 102   Anion Gap 5.0 - 15.0 mmol/L 13.0   Creatinine 0.57 - 1.00 mg/dL 0.84   BUN 8 - 23 mg/dL 32 (H)   BUN/Creatinine Ratio 7.0 - 25.0  38.1 (H)   Calcium 8.6 - 10.5 mg/dL 10.2   eGFR >60.0 mL/min/1.73 71.7   Hemoglobin A1C 4.80 - 5.60 % 5.80 (H)   C-Reactive Protein 0.00 - 0.50 mg/dL 0.47   Protime 12.2 - 14.5 Seconds 12.7   INR 0.89 - 1.12  0.95   PTT 22.0 - 39.0 seconds 25.0   WBC 3.40 - 10.80 10*3/mm3 10.40   RBC 3.77 - 5.28 10*6/mm3 4.44   Hemoglobin 12.0 - 15.9 g/dL 13.6   Hematocrit 34.0 - 46.6 % 41.0   RDW 12.3 - 15.4 % 13.4   MCV 79.0 - 97.0 fL 92.3   MCH 26.6 - 33.0 pg 30.6   MCHC 31.5 - 35.7 g/dL 33.2   MPV 6.0 - 12.0 fL 9.7   Platelets 140 - 450 10*3/mm3 321   (H): Data is abnormally high      Assessment and Plan:       S/P total left hip arthroplasty    Primary osteoarthritis of left hip    HTN (hypertension)      Plan:    1. PT/OT,  Weight bearing as tolerated left LE.    2. Pain control-prns  3. IS-encourage  4. DVT proph- Mechanicals and aspirin  5. Bowel regimen  6. Resume home medications as appropriate  7. Monitor post-op labs  8. DC planning, depending on progress with PT and pain control.      - Hypertension:  Resume home medications as appropriate, formulary substitution when indicated.  Holding parameters.  Prn  medications for elevated blood pressure.      Dragon disclaimer:  Part of this encounter note is an electronic transcription/translation of spoken language to printed text. The electronic translation of spoken language may permit erroneous, or at times, nonsensical words or phrases to be inadvertently transcribed; Although I have reviewed the note for such errors, some may still exist.    Roseanne Valencia MD  05/05/23  15:26 EDT

## 2023-05-06 LAB
ANION GAP SERPL CALCULATED.3IONS-SCNC: 6 MMOL/L (ref 5–15)
BUN SERPL-MCNC: 12 MG/DL (ref 8–23)
BUN/CREAT SERPL: 13.6 (ref 7–25)
CALCIUM SPEC-SCNC: 9.2 MG/DL (ref 8.6–10.5)
CHLORIDE SERPL-SCNC: 102 MMOL/L (ref 98–107)
CO2 SERPL-SCNC: 28 MMOL/L (ref 22–29)
CREAT SERPL-MCNC: 0.88 MG/DL (ref 0.57–1)
DEPRECATED RDW RBC AUTO: 45.8 FL (ref 37–54)
EGFRCR SERPLBLD CKD-EPI 2021: 67.8 ML/MIN/1.73
ERYTHROCYTE [DISTWIDTH] IN BLOOD BY AUTOMATED COUNT: 13.4 % (ref 12.3–15.4)
GLUCOSE SERPL-MCNC: 112 MG/DL (ref 65–99)
HCT VFR BLD AUTO: 31 % (ref 34–46.6)
HGB BLD-MCNC: 10.2 G/DL (ref 12–15.9)
MCH RBC QN AUTO: 30.9 PG (ref 26.6–33)
MCHC RBC AUTO-ENTMCNC: 32.9 G/DL (ref 31.5–35.7)
MCV RBC AUTO: 93.9 FL (ref 79–97)
PLATELET # BLD AUTO: 250 10*3/MM3 (ref 140–450)
PMV BLD AUTO: 10.3 FL (ref 6–12)
POTASSIUM SERPL-SCNC: 4.7 MMOL/L (ref 3.5–5.2)
RBC # BLD AUTO: 3.3 10*6/MM3 (ref 3.77–5.28)
SODIUM SERPL-SCNC: 136 MMOL/L (ref 136–145)
WBC NRBC COR # BLD: 11.66 10*3/MM3 (ref 3.4–10.8)

## 2023-05-06 PROCEDURE — A9270 NON-COVERED ITEM OR SERVICE: HCPCS | Performed by: INTERNAL MEDICINE

## 2023-05-06 PROCEDURE — A9270 NON-COVERED ITEM OR SERVICE: HCPCS | Performed by: ORTHOPAEDIC SURGERY

## 2023-05-06 PROCEDURE — 63710000001 DOCUSATE SODIUM 100 MG CAPSULE: Performed by: INTERNAL MEDICINE

## 2023-05-06 PROCEDURE — 97535 SELF CARE MNGMENT TRAINING: CPT

## 2023-05-06 PROCEDURE — 97116 GAIT TRAINING THERAPY: CPT

## 2023-05-06 PROCEDURE — 80048 BASIC METABOLIC PNL TOTAL CA: CPT | Performed by: INTERNAL MEDICINE

## 2023-05-06 PROCEDURE — 63710000001 POLYETHYLENE GLYCOL 17 G PACK: Performed by: INTERNAL MEDICINE

## 2023-05-06 PROCEDURE — 63710000001 MELOXICAM 15 MG TABLET: Performed by: ORTHOPAEDIC SURGERY

## 2023-05-06 PROCEDURE — 63710000001 AMLODIPINE 5 MG TABLET: Performed by: INTERNAL MEDICINE

## 2023-05-06 PROCEDURE — 97110 THERAPEUTIC EXERCISES: CPT

## 2023-05-06 PROCEDURE — 99024 POSTOP FOLLOW-UP VISIT: CPT | Performed by: ORTHOPAEDIC SURGERY

## 2023-05-06 PROCEDURE — 63710000001 ASPIRIN EC 325 MG TABLET DELAYED-RELEASE: Performed by: ORTHOPAEDIC SURGERY

## 2023-05-06 PROCEDURE — 97166 OT EVAL MOD COMPLEX 45 MIN: CPT

## 2023-05-06 PROCEDURE — 63710000001 MELATONIN 5 MG TABLET: Performed by: INTERNAL MEDICINE

## 2023-05-06 PROCEDURE — 25010000002 CEFAZOLIN IN DEXTROSE 2-4 GM/100ML-% SOLUTION: Performed by: ORTHOPAEDIC SURGERY

## 2023-05-06 PROCEDURE — 85027 COMPLETE CBC AUTOMATED: CPT | Performed by: ORTHOPAEDIC SURGERY

## 2023-05-06 PROCEDURE — 63710000001 OXYCODONE 5 MG TABLET: Performed by: ORTHOPAEDIC SURGERY

## 2023-05-06 RX ORDER — POLYETHYLENE GLYCOL 3350 17 G/17G
17 POWDER, FOR SOLUTION ORAL DAILY
Status: DISCONTINUED | OUTPATIENT
Start: 2023-05-06 | End: 2023-05-08 | Stop reason: HOSPADM

## 2023-05-06 RX ORDER — DOCUSATE SODIUM 100 MG/1
100 CAPSULE, LIQUID FILLED ORAL 2 TIMES DAILY
Status: DISCONTINUED | OUTPATIENT
Start: 2023-05-06 | End: 2023-05-08 | Stop reason: HOSPADM

## 2023-05-06 RX ORDER — BISACODYL 5 MG/1
5 TABLET, DELAYED RELEASE ORAL DAILY PRN
Status: DISCONTINUED | OUTPATIENT
Start: 2023-05-06 | End: 2023-05-08 | Stop reason: HOSPADM

## 2023-05-06 RX ADMIN — POLYETHYLENE GLYCOL 3350 17 G: 17 POWDER, FOR SOLUTION ORAL at 19:18

## 2023-05-06 RX ADMIN — Medication 10 ML: at 21:32

## 2023-05-06 RX ADMIN — OXYCODONE HYDROCHLORIDE 5 MG: 5 TABLET ORAL at 21:32

## 2023-05-06 RX ADMIN — MELOXICAM 15 MG: 15 TABLET ORAL at 08:30

## 2023-05-06 RX ADMIN — OXYCODONE HYDROCHLORIDE 5 MG: 5 TABLET ORAL at 00:20

## 2023-05-06 RX ADMIN — Medication 2.5 MG: at 21:32

## 2023-05-06 RX ADMIN — SODIUM CHLORIDE 120 ML/HR: 0.9 INJECTION, SOLUTION INTRAVENOUS at 04:17

## 2023-05-06 RX ADMIN — CEFAZOLIN SODIUM 2 G: 2 INJECTION, SOLUTION INTRAVENOUS at 02:16

## 2023-05-06 RX ADMIN — DOCUSATE SODIUM 100 MG: 100 CAPSULE, LIQUID FILLED ORAL at 19:18

## 2023-05-06 RX ADMIN — OXYCODONE HYDROCHLORIDE 5 MG: 5 TABLET ORAL at 17:34

## 2023-05-06 RX ADMIN — Medication 10 ML: at 08:31

## 2023-05-06 RX ADMIN — OXYCODONE HYDROCHLORIDE 5 MG: 5 TABLET ORAL at 08:30

## 2023-05-06 RX ADMIN — OXYCODONE HYDROCHLORIDE 5 MG: 5 TABLET ORAL at 12:40

## 2023-05-06 RX ADMIN — AMLODIPINE BESYLATE 5 MG: 5 TABLET ORAL at 08:29

## 2023-05-06 RX ADMIN — ASPIRIN 325 MG: 325 TABLET, COATED ORAL at 08:30

## 2023-05-06 NOTE — PLAN OF CARE
Goal Outcome Evaluation:  Plan of Care Reviewed With: patient        Progress: improving  Outcome Evaluation: patient ambulated 60 feet with Min assist x1 and rolling walker for support, tech followed with chair for safety. Patient needs cues for walker placement, upright posture and step sequence. Patient intitially sliding L LE but with increased distance able to lift foot. Distsance limtied pain and weakness. Pre-medicated for tx. HEP completed with verbal cues for technique. Recommend SNF for best outcome.

## 2023-05-06 NOTE — PLAN OF CARE
Goal Outcome Evaluation:      Pt able to ambulate to BR and within room several times with 1 assist, GB, and walker. RA/2L w/ sleep. VSS. Pain well controlled with cold therapy and multiple administrations of PRN pain medication.

## 2023-05-06 NOTE — PROGRESS NOTES
IM progress note      Janet Darden  [id number]  1945     LOS: 0 days     Attending: Ezekiel Rodriguez MD    Primary Care Provider: Shaq Joya MD      Chief Complaint/Reason for visit:  No chief complaint on file.      Subjective    Feels ok. Better pain control. No f/c/n/vom/sob.       Objective        Visit Vitals  /57 (BP Location: Left arm, Patient Position: Sitting)   Pulse 99   Temp 98.6 °F (37 °C) (Oral)   Resp 18   SpO2 98%     Temp (24hrs), Av.8 °F (36.6 °C), Min:97.3 °F (36.3 °C), Max:98.6 °F (37 °C)      Intake/Output:    Intake/Output Summary (Last 24 hours) at 2023 1254  Last data filed at 2023 0900  Gross per 24 hour   Intake 2872 ml   Output 950 ml   Net 1922 ml   OT:  Taken 2023 0846 by Valery Banegas OT  Progress: (IE) --  Plan of Care Reviewed With: patient  Outcome Evaluation: Pt participates in therapy with encouragement and time. BUE AROM, strength, and sensation are intact for activity. Pt up in room and transfering with RW support and Min Ax1. Limited by pain. Education initiated for transfer training, precautions, and ADL retraining. Recommend follow up teaching to advance ADL performance. OT will follow IP. Recommend SNF at d/c for best outcome.          Physical Therapy:  Pending.    Physical Exam:     General Appearance:    Alert, cooperative, in no acute distress   Head:    Normocephalic, without obvious abnormality, atraumatic    Lungs:     Normal effort, symmetric chest rise,  clear to      auscultation bilaterally              Heart:    Regular rhythm and normal rate, normal S1 and S2    Abdomen:     Normal bowel sounds, no masses, no organomegaly, soft        non-tender, non-distended, no guarding, no rebound                tenderness   Extremities:   CDI dressing , exofen, over left hip incision. Flexion and dorsiflexion intact bilateral feet.    No clubbing, cyanosis or edema.  No deformities.    Pulses:   Pulses palpable and equal  bilaterally   Skin:   No bleeding, bruising or rash          Results Review:     I reviewed the patient's new clinical results.   Results from last 7 days   Lab Units 05/06/23  0521   WBC 10*3/mm3 11.66*   HEMOGLOBIN g/dL 10.2*   HEMATOCRIT % 31.0*   PLATELETS 10*3/mm3 250     Results from last 7 days   Lab Units 05/06/23  0521   SODIUM mmol/L 136   POTASSIUM mmol/L 4.7   CHLORIDE mmol/L 102   CO2 mmol/L 28.0   BUN mg/dL 12   CREATININE mg/dL 0.88   CALCIUM mg/dL 9.2   GLUCOSE mg/dL 112*     I reviewed the patient's new imaging including images and reports.    All medications reviewed.   amLODIPine, 5 mg, Oral, Daily  aspirin, 325 mg, Oral, Daily  melatonin, 2.5 mg, Oral, Nightly  meloxicam, 15 mg, Oral, Daily  sodium chloride, 10 mL, Intravenous, Q12H      HYDROmorphone, 0.5 mg, Q2H PRN   And  naloxone, 0.1 mg, Q5 Min PRN  labetalol, 10 mg, Q4H PRN  Morphine, 4 mg, Q2H PRN   And  naloxone, 0.4 mg, Q5 Min PRN  ondansetron, 4 mg, Q6H PRN   Or  ondansetron, 4 mg, Q6H PRN  oxyCODONE, 5 mg, Q4H PRN  sodium chloride, 500 mL, TID PRN  sodium chloride, 1-10 mL, PRN  sodium chloride, 40 mL, PRN        Assessment & Plan       S/P total left hip arthroplasty    Primary osteoarthritis of left hip    HTN (hypertension)         Plan   1. PT/OT,  Weight bearing as tolerated left LE.     2. Pain control-prns  3. IS-encourage  4. DVT proph- Mechanicals and aspirin  5. Bowel regimen  6. Resume home medications as appropriate  7. Monitor post-op labs  8. DC planning, SNF recommended.  to follow.     - Hypertension:  Resume home medications as appropriate, formulary substitution when indicated.  Holding parameters.  Prn medications for elevated blood pressure    Roseanne Valencia MD  05/06/23  12:54 EDT

## 2023-05-06 NOTE — PROGRESS NOTES
[facility] Progress Note    Subjective      LOS: 0 days   Patient Care Team:  Shaq Joya MD as PCP - General (Adolescent Medicine)    CC: Left hip pain    Interval History:   Patient resting comfortably in a chair.  Pain controlled currently.    Objective      Vital Signs  Temp (24hrs), Av.8 °F (36.6 °C), Min:97.3 °F (36.3 °C), Max:98.6 °F (37 °C)      /57 (BP Location: Left arm, Patient Position: Sitting)   Pulse 96   Temp 98.6 °F (37 °C) (Oral)   Resp 18   SpO2 91%     Examination:   Examination of the left hip: The wound is clean, dry, and intact.  Knee flexion, knee extension, ankle dorsiflexion, ankle plantar flexion, and EHL are intact.  Sensation intact in the foot to light touch.   Thigh is soft and nontender.      Labs:  Results from last 7 days   Lab Units 23  0521   WBC 10*3/mm3 11.66*   HEMOGLOBIN g/dL 10.2*   HEMATOCRIT % 31.0*   MCV fL 93.9   PLATELETS 10*3/mm3 250       Radiology:  Imaging Results (Last 24 Hours)     Procedure Component Value Units Date/Time    FL C Arm During Surgery [[id number]] Collected: 23 1404     Updated: 23 1407    Narrative:      FL C ARM DURING SURGERY    Date of Exam: 2023 11:19 AM EDT    Indication: MODIFIED ANTERIOR TOTAL HIP ARTHROPLASTY - LEFT.    Comparison: None available.    Technique: 3 fluoroscopic spot images of the left hip obtained over 27.1 seconds of fluoroscopy time    Fluoroscopic Time: 27.1 seconds    Findings:  Fluoroscopic imaging demonstrates placement of total left hip arthroplasty      Impression:      Impression:      Fluoroscopic imaging obtained without a radiologist present. Please see performing provider notes for full detail.      Electronically Signed: Theo Salomon    2023 2:04 PM EDT    Workstation ID: [id number]    XR Hip With or Without Pelvis 2 - 3 View Left [[id number]] Collected: 23 1353     Updated: 23 1400    Narrative:      XR HIP W OR WO PELVIS 2-3 VIEW  LEFT    Date of Exam: 5/5/2023 1:22 PM EDT    Indication: post-op left ARNOLD    Comparison: 4/19/2023    Findings:  Total left hip prosthesis is now seen in anatomic alignment. No acute bony abnormality is identified. There is expected postop soft tissue air.      Impression:      Impression:  Total left hip prosthesis in anatomic alignment.      Electronically Signed: Maximo Winchester    5/5/2023 1:57 PM EDT    Workstation ID: [id number]        Case management notes:    chart reviewed. I met with Mrs Darden and family at bedside to initiate d/c planning. She lives with her  who states he has limited ability to assist with her care. She states the past 2 weeks her mobility was very limited due to pain and she required a wheelchair. She and her family would like her to go to short term inpt rehab. Lengthy discussion explaining acute vs skilled level of rehab. Her insurance will only cover the skilled level of rehab in a SNF. Options presented. She has requested a referral to [facility] ( [facility] I spoke with Nuria who accepted referral. Her insurance will require pre authorization, the earliest they can obtain this will be Monday(since insurance closed over WE). Case mgt will f/u Monday am    PT:  Physical Therapy - Plan of Care Review - Outcome Summary:  Outcome Evaluation: PT initial eval completed. Pt s/p ARNOLD 5/5 and presents with decreased strength, impaired balance, increased pain and poor endurance causing functional mobility below baseline. Pt ambulated 60 with min Ax1+1 for close chair follow and FWW. Gait mechanics improved with cues. No LOB. HEP completed. Pt will benefit from skilled IPPT for addressing deficits. PT rec d/c to IRF for best outcomes. (05/05/23 0056)]       Results Review:     I reviewed the patient's new clinical results.    Assessment and Plan     Assessment:   Status post left total hip arthroplasty-doing well    Patient encouraged with rehabilitation efforts      S/P total left hip  arthroplasty    Primary osteoarthritis of left hip    HTN (hypertension)      Plan for disposition: Plan for discharge most likely to skilled nursing facility at the time of discharge.  Case management to follow-up on Monday.  Follow-up with me in 3 weeks in the office.      Future Appointments   Date Time Provider Department Center   5/31/2023  1:30 PM Ezekiel Rodriguez MD [facility]           Ezekiel Rodriguez MD  05/06/23  10:36 EDT

## 2023-05-06 NOTE — PLAN OF CARE
Problem: Adult Inpatient Plan of Care  Goal: Plan of Care Review  Recent Flowsheet Documentation  Taken 5/6/2023 0846 by Valery Banegas OT  Progress: (IE) --  Plan of Care Reviewed With: patient  Outcome Evaluation: Pt participates in therapy with encouragement and time. BUE AROM, strength, and sensation are intact for activity. Pt up in room and transfering with RW support and Min Ax1. Limited by pain. Education initiated for transfer training, precautions, and ADL retraining. Recommend follow up teaching to advance ADL performance. OT will follow IP. Recommend SNF at d/c for best outcome.

## 2023-05-06 NOTE — THERAPY EVALUATION
Patient Name: Janet Darden  : 1945    MRN: [id number]                              Today's Date: 2023       Admit Date: 2023    Visit Dx:     ICD-10-CM ICD-9-CM   1. Primary osteoarthritis of left hip  M16.12 715.15     Patient Active Problem List   Diagnosis   • Primary osteoarthritis of left hip   • HTN (hypertension)   • S/P total left hip arthroplasty     Past Medical History:   Diagnosis Date   • Arthritis    • Hypertension    • Osteoporosis      Past Surgical History:   Procedure Laterality Date   • CHOLECYSTECTOMY     • COLONOSCOPY     • HYSTERECTOMY     • MOUTH SURGERY      jaw surgery      General Information     Row Name 23 0836          OT Time and Intention    Document Type evaluation;therapy note (daily note)  -TB     Mode of Treatment occupational therapy;individual therapy  -TB     Row Name 2336          General Information    Patient Profile Reviewed yes  -TB     Prior Level of Function mod assist:;ADL's;bed mobility;min assist:;transfer  Pt reports steady decline over past several months. Progressed from using cane>RW>w/c for last month  -TB     Existing Precautions/Restrictions fall;left;hip, anterior;other (see comments)  s/p L Anterior Modified AR[place]OLD  -TB     Barriers to Rehab previous functional deficit  -TB     Row Name 2336          Occupational Profile    Reason for Services/Referral (Occupational Profile) Occupational decline  -TB     Environmental Supports and Barriers (Occupational Profile) Pt lives with her spouse in a 2-story home. 1-step to enter. Pt lives on first floor. Son who works FT lives upstairs. SPC, RW, w/c at home. Sponge bathes only at baseline. High bed with assist of 2 to enter prior.  -TB     Row Name 2336          Living Environment    People in Home spouse;child(reece), adult  -TB     Row Name 2336          Home Main Entrance    Number of Stairs, Main Entrance one  -TB     Stair Railings, Main  Entrance none  -TB     Row Name 05/06/23 0836          Stairs Within Home, Primary    Number of Stairs, Within Home, Primary none  -TB     Row Name 05/06/23 0836          Cognition    Orientation Status (Cognition) oriented x 4  -TB     Row Name 05/06/23 0836          Safety Issues, Functional Mobility    Safety Issues Affecting Function (Mobility) insight into deficits/self-awareness;awareness of need for assistance;safety precaution awareness;safety precautions follow-through/compliance;positioning of assistive device;sequencing abilities  -TB     Impairments Affecting Function (Mobility) balance;endurance/activity tolerance;pain;strength;postural/trunk control;range of motion (ROM)  -TB     Comment, Safety Issues/Impairments (Mobility) Pt up in room and transfering with RW support and Min Ax1. Limited by pain.  -TB           User Key  (r) = Recorded By, (t) = Taken By, (c) = Cosigned By    Initials Name Provider Type     Valery Banegas, OT Occupational Therapist                 Mobility/ADL's     Row Name 05/06/23 0841          Bed Mobility    Bed Mobility supine-sit;scooting/bridging  -TB     Scooting/Bridging [place] (Bed Mobility) moderate assist (50% patient effort);2 person assist;verbal cues  -TB     Supine-Sit [place] (Bed Mobility) moderate assist (50% patient effort);2 person assist;verbal cues  -TB     Bed Mobility, Safety Issues decreased use of legs for bridging/pushing;impaired trunk control for bed mobility  -TB     Assistive Device (Bed Mobility) head of bed elevated;draw sheet;bed rails  -TB     Comment, (Bed Mobility) AE issued and teaching initiated. Pain limits use. Education and cues for sequencing. Assist to advance BLE to EOB and to raise trunk from bed surface.  -TB     Row Name 05/06/23 0841          Transfers    Transfers sit-stand transfer;stand-sit transfer;toilet transfer;bed-chair transfer  -TB     Comment, (Transfers) Education, cues, and assist for hand  placement, sequencing, and safety. Good effort.  -     Row Name 05/06/23 0841          Bed-Chair Transfer    Bed-Chair [place] (Transfers) minimum assist (75% patient effort);1 person assist;verbal cues  -TB     Assistive Device (Bed-Chair Transfers) walker, front-wheeled  -TB     Row Name 05/06/23 0841          Sit-Stand Transfer    Sit-Stand [place] (Transfers) minimum assist (75% patient effort);1 person assist;verbal cues  -TB     Assistive Device (Sit-Stand Transfers) walker, front-wheeled  -TB     Row Name 05/06/23 0841          Stand-Sit Transfer    Stand-Sit [place] (Transfers) minimum assist (75% patient effort);1 person assist;verbal cues  -TB     Assistive Device (Stand-Sit Transfers) walker, front-wheeled  -TB     Row Name 05/06/23 0841          Toilet Transfer    Type (Toilet Transfer) sit-stand;stand-sit  -TB     [place] Level (Toilet Transfer) minimum assist (75% patient effort);1 person assist;verbal cues  -TB     Assistive Device (Toilet Transfer) commode, bedside without drop arms;walker, front-wheeled  -TB     Row Name 05/06/23 0841          Functional Mobility    Functional Mobility- Ind. Level minimum assist (75% patient effort);1 person;verbal cues required  -TB     Functional Mobility- Device walker, front-wheeled  -TB     Functional Mobility-Distance (Feet) 6  3+3  -TB     Functional Mobility- Safety Issues step length decreased;weight-shifting ability decreased;sequencing ability decreased;balance decreased during turns  -TB     Functional Mobility- Comment Good effort. Limited by pain.  -     Row Name 05/06/23 0841          Activities of Daily Living    BADL Assessment/Intervention bathing;lower body dressing;grooming;toileting;feeding  -     Row Name 05/06/23 0841          Mobility    Extremity Weight-bearing Status left lower extremity  -TB     Left Lower Extremity (Weight-bearing Status) weight-bearing as tolerated (WBAT)  -     Row Name 05/06/23 0841           Bathing Assessment/Intervention    [place] Level (Bathing) set up;distal lower extremities/feet  -TB     Assistive Devices (Bathing) long-handled sponge  -TB     Position (Bathing) supported sitting  -TB     Comment, (Bathing) Education/demonstration initiated for precautions and ADL retraining.  -TB     Row Name 05/06/23 0841          Lower Body Dressing Assessment/Training    [place] Level (Lower Body Dressing) lower body dressing skills;dependent (less than 25% patient effort);don;socks  -TB     Position (Lower Body Dressing) edge of bed sitting  -TB     Comment, (Lower Body Dressing) Education/demonstration initiated for precautions and ADL retraining. Follow up teaching needed. Limited by pain at IE.  -TB     Row Name 05/06/23 0841          Grooming Assessment/Training    [place] Level (Grooming) set up;wash face, hands;oral care regimen;hair care, combing/brushing  -TB     Position (Grooming) supported sitting  -TB     Row Name 05/06/23 0841          Toileting Assessment/Training    [place] Level (Toileting) dependent (less than 25% patient effort);adjust/manage clothing;set up;perform perineal hygiene  -TB     Position (Toileting) unsupported sitting;supported standing  -TB     Row Name 05/06/23 0841          Self-Feeding Assessment/Training    [place] Level (Feeding) independent;scoop food and bring to mouth;liquids to mouth  -TB     Position (Self-Feeding) supported sitting  -TB           User Key  (r) = Recorded By, (t) = Taken By, (c) = Cosigned By    Initials Name Provider Type    TB Valery Banegas OT Occupational Therapist               Obj/Interventions     Row Name 05/06/23 0845          Sensory Assessment (Somatosensory)    Sensory Assessment (Somatosensory) UE sensation intact  -TB     Row Name 05/06/23 0845          Vision Assessment/Intervention    Visual Impairment/Limitations corrective lenses full-time  -TB     Row Name 05/06/23 0845          Range of Motion  Comprehensive    General Range of Motion bilateral upper extremity ROM WFL  -TB     Comment, General Range of Motion BUE AROM WFL for self-care  -TB     Row Name 05/06/23 0845          Strength Comprehensive (MMT)    Comment, General Manual Muscle Testing (MMT) Assessment Generalized weakness, no focal deficits  -TB     Row Name 05/06/23 0845          Balance    Balance Assessment sitting dynamic balance;sit to stand dynamic balance;standing dynamic balance  -TB     Dynamic Sitting Balance supervision  -TB     Position, Sitting Balance unsupported;sitting in chair;sitting edge of bed  BSC  -TB     Sit to Stand Dynamic Balance minimal assist;1-person assist;verbal cues  -TB     Dynamic Standing Balance minimal assist;1-person assist;verbal cues  -TB     Position/Device Used, Standing Balance walker, front-wheeled;supported  -TB     Balance Interventions sitting;standing;sit to stand;supported;dynamic;dynamic reaching;occupation based/functional task;UE activity with balance activity  -TB           User Key  (r) = Recorded By, (t) = Taken By, (c) = Cosigned By    Initials Name Provider Type    TB Valery Banegas OT Occupational Therapist               Goals/Plan     Row Name 05/06/23 0850          Bed Mobility Goal 1 (OT)    Activity/Assistive Device (Bed Mobility Goal 1, OT) sit to supine  -TB     [place] Level/Cues Needed (Bed Mobility Goal 1, OT) moderate assist (50-74% patient effort);verbal cues required  -TB     Time Frame (Bed Mobility Goal 1, OT) by discharge  -TB     Strategies/Barriers (Bed Mobility Goal 1, OT) Pt has elevated bed at home. Needs teaching for use of step stool.  -TB     Progress/Outcomes (Bed Mobility Goal 1, OT) goal ongoing  -TB     Row Name 05/06/23 0837          Transfer Goal 1 (OT)    Activity/Assistive Device (Transfer Goal 1, OT) toilet  -TB     [place] Level/Cues Needed (Transfer Goal 1, OT) minimum assist (75% or more patient effort);verbal cues required  -TB      Time Frame (Transfer Goal 1, OT) by discharge  -TB     Strategies/Barriers (Transfers Goal 1, OT) Progress to bathroom for transfer training to support household distances.  -TB     Progress/Outcome (Transfer Goal 1, OT) goal ongoing  -TB     Row Name 05/06/23 0850          Dressing Goal 1 (OT)    Activity/Device (Dressing Goal 1, OT) lower body dressing  -TB     [place]/Cues Needed (Dressing Goal 1, OT) moderate assist (50-74% patient effort);verbal cues required  -TB     Time Frame (Dressing Goal 1, OT) by discharge  -TB     Strategies/Barriers (Dressing Goal 1, OT) L Anterior Modified [place]  -TB     Progress/Outcome (Dressing Goal 1, OT) goal ongoing  -TB           User Key  (r) = Recorded By, (t) = Taken By, (c) = Cosigned By    Initials Name Provider Type    TB Valery Banegas, OT Occupational Therapist               Clinical Impression     Row Name 05/06/23 0846          Pain Assessment    Pretreatment Pain Rating 6/10  -TB     Posttreatment Pain Rating 6/10  -TB     Pain Location - Side/Orientation Right  -TB     Pain Location generalized  -TB     Pain Location - hip  -TB     Pre/Posttreatment Pain Comment Increased pain with all mobility.  -TB     Pain Intervention(s) Ambulation/increased activity;Repositioned;Cold applied;Elevated  -TB     Row Name 05/06/23 0846          Plan of Care Review    Plan of Care Reviewed With patient  -TB     Progress --  IE  -TB     Outcome Evaluation Pt participates in therapy with encouragement and time. BUE AROM, strength, and sensation are intact for activity. Pt up in room and transfering with RW support and Min Ax1. Education initiated for transfer training, precautions, and ADL retraining. Recommend follow up teaching to advance ADL performance. OT will follow IP. Recommend SNF at d/c for best outcome.  -TB     Row Name 05/06/23 0846          Therapy Assessment/Plan (OT)    Rehab Potential (OT) good, to achieve stated therapy goals  -TB     Criteria for Skilled  Therapeutic Interventions Met (OT) yes;meets criteria;skilled treatment is necessary  -TB     Therapy Frequency (OT) daily  -TB     Row Name 05/06/23 0846          Therapy Plan Review/Discharge Plan (OT)    Anticipated Discharge Disposition (OT) skilled nursing facility  -TB     Row Name 05/06/23 0846          Vital Signs    Pre Systolic BP Rehab --  RN cleared OT  -TB     O2 Delivery Pre Treatment room air  -TB     Pre Patient Position Supine  -TB     Intra Patient Position Standing  -TB     Post Patient Position Sitting  -TB     Row Name 05/06/23 0846          Positioning and Restraints    Pre-Treatment Position in bed  -TB     Post Treatment Position chair  -TB     In Chair reclined;call light within reach;encouraged to call for assist;waffle cushion;legs elevated;with nsg  RN to place exit alarm  -TB           User Key  (r) = Recorded By, (t) = Taken By, (c) = Cosigned By    Initials Name Provider Type    Valery Alicia OT Occupational Therapist               Outcome Measures     Row Name 05/06/23 0857          How much help from another is currently needed...    Putting on and taking off regular lower body clothing? 2  -TB     Bathing (including washing, rinsing, and drying) 2  -TB     Toileting (which includes using toilet bed pan or urinal) 2  -TB     Putting on and taking off regular upper body clothing 3  -TB     Taking care of personal grooming (such as brushing teeth) 3  -TB     Eating meals 4  -TB     AM-PAC 6 Clicks Score (OT) 16  -TB     Row Name 05/06/23 0857          Functional Assessment    Outcome Measure Options AM-PAC 6 Clicks Daily Activity (OT)  -TB           User Key  (r) = Recorded By, (t) = Taken By, (c) = Cosigned By    Initials Name Provider Type    Valery Alicia OT Occupational Therapist                Occupational Therapy Education     Title: PT OT SLP Therapies (In Progress)     Topic: Occupational Therapy (In Progress)     Point: ADL training (Done)      Description:   Instruct learner(s) on proper safety adaptation and remediation techniques during self care or transfers.   Instruct in proper use of assistive devices.              Learning Progress Summary           Patient Acceptance, E,D, VU,NR by TB at 5/6/2023 0857    Comment: Limted by pain at IE. Recommend follow up ADL/AE teaching and trial.                   Point: Home exercise program (Not Started)     Description:   Instruct learner(s) on appropriate technique for monitoring, assisting and/or progressing therapeutic exercises/activities.              Learner Progress:  Not documented in this visit.          Point: Precautions (Done)     Description:   Instruct learner(s) on prescribed precautions during self-care and functional transfers.              Learning Progress Summary           Patient Acceptance, E,D, VU,NR by TB at 5/6/2023 0857    Comment: Limted by pain at IE. Recommend follow up ADL/AE teaching and trial.                   Point: Body mechanics (Not Started)     Description:   Instruct learner(s) on proper positioning and spine alignment during self-care, functional mobility activities and/or exercises.              Learner Progress:  Not documented in this visit.                      User Key     Initials Effective Dates Name Provider Type Discipline     02/03/23 -  Valery Banegas OT Occupational Therapist OT              OT Recommendation and Plan  Therapy Frequency (OT): daily  Plan of Care Review  Plan of Care Reviewed With: patient  Progress:  (IE)  Outcome Evaluation: Pt participates in therapy with encouragement and time. BUE AROM, strength, and sensation are intact for activity. Pt up in room and transfering with RW support and Min Ax1. Education initiated for transfer training, precautions, and ADL retraining. Recommend follow up teaching to advance ADL performance. OT will follow IP. Recommend SNF at d/c for best outcome.     Time Calculation:    Time Calculation- OT      Row Name 05/06/23 0752             Time Calculation- OT    OT Start Time 0752  -TB      OT Received On 05/06/23  -TB      OT Goal Re-Cert Due Date 05/16/23  -TB         Timed Charges    71576 - OT Self Care/Mgmt Minutes 28  -TB         Untimed Charges    OT Eval/Re-eval Minutes 40  -TB         Total Minutes    Timed Charges Total Minutes 28  -TB      Untimed Charges Total Minutes 40  -TB       Total Minutes 68  -TB            User Key  (r) = Recorded By, (t) = Taken By, (c) = Cosigned By    Initials Name Provider Type    TB Valery Banegas, OT Occupational Therapist              Therapy Charges for Today     Code Description Service Date Service Provider Modifiers Qty    94136449997 HC OT SELF CARE/MGMT/TRAIN EA 15 MIN 5/6/2023 Valery Banegas OT GO 2    [id number] HC OT EVAL MOD COMPLEXITY 3 5/6/2023 Valery Banegas OT GO 1               Valery Banegas OT  5/6/2023

## 2023-05-06 NOTE — THERAPY TREATMENT NOTE
Patient Name: Janet Darden  : 1945    MRN: [id number]                              Today's Date: 2023       Admit Date: 2023    Visit Dx:     ICD-10-CM ICD-9-CM   1. Primary osteoarthritis of left hip  M16.12 715.15     Patient Active Problem List   Diagnosis   • Primary osteoarthritis of left hip   • HTN (hypertension)   • S/P total left hip arthroplasty     Past Medical History:   Diagnosis Date   • Arthritis    • Hypertension    • Osteoporosis      Past Surgical History:   Procedure Laterality Date   • CHOLECYSTECTOMY     • COLONOSCOPY     • HYSTERECTOMY     • MOUTH SURGERY      jaw surgery      General Information     Row Name 23 1413          Physical Therapy Time and Intention    Document Type therapy note (daily note)  -AS     Mode of Treatment physical therapy  -AS     Row Name 23 1413          General Information    Patient Profile Reviewed yes  -AS     Existing Precautions/Restrictions fall;left;hip, anterior;other (see comments)  s/p L ARNOLD anterior modified  -AS     Barriers to Rehab previous functional deficit  -AS     Row Name 23 1413          Cognition    Orientation Status (Cognition) oriented x 4  -AS     Row Name 23 1413          Safety Issues, Functional Mobility    Safety Issues Affecting Function (Mobility) awareness of need for assistance;insight into deficits/self-awareness;safety precaution awareness;safety precautions follow-through/compliance;sequencing abilities;positioning of assistive device  -AS     Impairments Affecting Function (Mobility) balance;endurance/activity tolerance;pain;strength;postural/trunk control;range of motion (ROM)  -AS     Comment, Safety Issues/Impairments (Mobility) alert and following commands, pre-medicated for tx  -AS           User Key  (r) = Recorded By, (t) = Taken By, (c) = Cosigned By    Initials Name Provider Type    AS Minnie Jimenez PTA Physical Therapist Assistant               Mobility      Row Name 05/06/23 1416          Bed Mobility    Comment, (Bed Mobility) patient sitting [facility] pre/post tx  -AS     Row Name 05/06/23 1416          Transfers    Comment, (Transfers) verbal cues for hand placement and sliding L LE prior to sitting  -AS     Row Name 05/06/23 1416          Sit-Stand Transfer    Sit-Stand [place] (Transfers) verbal cues;minimum assist (75% patient effort);1 person assist  -AS     Assistive Device (Sit-Stand Transfers) walker, front-wheeled  -AS     Comment, (Sit-Stand Transfer) cues for step sequencing adn L LE placement  -AS     Row Name 05/06/23 1416          Gait/Stairs (Locomotion)    [place] Level (Gait) minimum assist (75% patient effort);verbal cues;nonverbal cues (demo/gesture);1 person assist;1 person to manage equipment  -AS     Assistive Device (Gait) walker, front-wheeled  -AS     Distance in Feet (Gait) 60  -AS     Deviations/Abnormal Patterns (Gait) bilateral deviations;base of support, wide;wan decreased;gait speed decreased;stride length decreased;steppage  -AS     Bilateral Gait Deviations forward flexed posture;heel strike decreased  -AS     Left Sided Gait Deviations weight shift ability decreased  -AS     Comment, (Gait/Stairs) patient ambulated 60 feet with Min assist x1 and rolling walker for support, tech followed with chair for safety. Patient needs cues for walker placement, upright posture and step sequence. Patient intitially sliding L LE but with increased distance able to lift foot. Distsance limtied pain and weakness. Pre-medicated for tx.  -AS           User Key  (r) = Recorded By, (t) = Taken By, (c) = Cosigned By    Initials Name Provider Type    AS Minnie Jimenez, LARRY Physical Therapist Assistant               Obj/Interventions     Row Name 05/06/23 1434          Motor Skills    Therapeutic Exercise knee;ankle  -AS     Row Name 05/06/23 1434          Knee (Therapeutic Exercise)    Knee (Therapeutic Exercise) isometric exercises  -AS      Knee Isometrics (Therapeutic Exercise) bilateral;gluteal sets;quad sets;supine;10 repetitions;5 second hold  -AS     Knee Strengthening (Therapeutic Exercise) left;SLR (straight leg raise);supine;10 repetitions  cues for technique  -AS     Row Name 05/06/23 1434          Ankle (Therapeutic Exercise)    Ankle (Therapeutic Exercise) AROM (active range of motion)  -AS     Ankle AROM (Therapeutic Exercise) bilateral;dorsiflexion;plantarflexion;supine;10 repetitions  -AS     [place] Name 05/06/23 1434          Balance    Dynamic Standing Balance verbal cues;minimal assist;1-person assist  -AS     Position/Device Used, Standing Balance supported;walker, front-wheeled  -AS     Comment, Balance no overt LOB  -AS           User Key  (r) = Recorded By, (t) = Taken By, (c) = Cosigned By    Initials Name Provider Type    AS Minnie Jimenez PTA Physical Therapist Assistant               Goals/Plan    No documentation.                Clinical Impression     [place] Name 05/06/23 1436          Pain    Pretreatment Pain Rating 5/10  -AS     Posttreatment Pain Rating 5/10  -AS     Pain Location - Side/Orientation Left  -AS     Pain Location - hip  -AS     Pain Intervention(s) Repositioned;Ambulation/increased activity  -AS     Row Name 05/06/23 1436          Plan of Care Review    Plan of Care Reviewed With patient  -AS     Progress improving  -AS     Outcome Evaluation patient ambulated 60 feet with Min assist x1 and rolling walker for support, tech followed with chair for safety. Patient needs cues for walker placement, upright posture and step sequence. Patient intitially sliding L LE but with increased distance able to lift foot. Distsance limtied pain and weakness. Pre-medicated for tx. HEP completed with verbal cues for technique. Recommend SNF for best outcome.  -AS     Row Name 05/06/23 1436          Positioning and Restraints    Pre-Treatment Position sitting in chair/recliner  -AS     Post Treatment Position chair  -AS     In  Chair reclined;call light within reach;encouraged to call for assist;exit alarm on;waffle cushion;legs elevated;with family/caregiver  -AS           User Key  (r) = Recorded By, (t) = Taken By, (c) = Cosigned By    Initials Name Provider Type    AS Minnie Jimenze PTA Physical Therapist Assistant               Outcome Measures     Row Name 05/06/23 1438 05/06/23 0830       How much help from another person do you currently need...    Turning from your back to your side while in flat bed without using bedrails? 3  -AS 3  -MW    Moving from lying on back to sitting on the side of a flat bed without bedrails? 2  -AS 2  -MW    Moving to and from a bed to a chair (including a wheelchair)? 3  -AS 3  -MW    Standing up from a chair using your arms (e.g., wheelchair, bedside chair)? 3  -AS 3  -MW    Climbing 3-5 steps with a railing? 2  -AS 2  -MW    To walk in hospital room? 3  -AS 3  -MW    AM-PAC 6 Clicks Score (PT) 16  -AS 16  -MW    Highest level of mobility 5 --> Static standing  -AS 5 --> Static standing  -MW    Row Name 05/06/23 1438 05/06/23 0857       Functional Assessment    Outcome Measure Options AM-PAC 6 Clicks Basic Mobility (PT)  -AS AM-PAC 6 Clicks Daily Activity (OT)  -TB          User Key  (r) = Recorded By, (t) = Taken By, (c) = Cosigned By    Initials Name Provider Type    TB Valery Banegas OT Occupational Therapist    AS Minnie Jimenez PTA Physical Therapist Assistant    Lena Remy RN Registered Nurse                             Physical Therapy Education     Title: PT OT SLP Therapies (In Progress)     Topic: Physical Therapy (In Progress)     Point: Mobility training (In Progress)     Learning Progress Summary           Patient Acceptance, E, NR by AS at 5/6/2023 1439    Acceptance, E,D, VU,NR by AB at 5/5/2023 1644   Family Acceptance, E,D, VU,NR by AB at 5/5/2023 1644                   Point: Home exercise program (In Progress)     Learning Progress Summary            Patient Acceptance, E, NR by AS at 5/6/2023 1439    Acceptance, E,D, VU,NR by AB at 5/5/2023 1644   Family Acceptance, E,D, VU,NR by AB at 5/5/2023 1644                   Point: Body mechanics (In Progress)     Learning Progress Summary           Patient Acceptance, E, NR by AS at 5/6/2023 1439    Acceptance, E,D, VU,NR by AB at 5/5/2023 1644   Family Acceptance, E,D, VU,NR by AB at 5/5/2023 1644                   Point: Precautions (In Progress)     Learning Progress Summary           Patient Acceptance, E, NR by AS at 5/6/2023 1439    Acceptance, E,D, VU,NR by AB at 5/5/2023 1644   Family Acceptance, E,D, VU,NR by AB at 5/5/2023 1644                               User Key     Initials Effective Dates Name Provider Type Discipline    AS 04/28/23 -  Minnie Jimenez PTA Physical Therapist Assistant PT    AB 09/22/22 -  Minnie Mccormick, PT Physical Therapist PT              PT Recommendation and Plan     Plan of Care Reviewed With: patient  Progress: improving  Outcome Evaluation: patient ambulated 60 feet with Min assist x1 and rolling walker for support, tech followed with chair for safety. Patient needs cues for walker placement, upright posture and step sequence. Patient intitially sliding L LE but with increased distance able to lift foot. Distsance limtied pain and weakness. Pre-medicated for tx. HEP completed with verbal cues for technique. Recommend SNF for best outcome.     Time Calculation:    PT Charges     Row Name 05/06/23 1439             Time Calculation    Start Time 1340  -AS      PT Received On 05/06/23  -AS      PT Goal Re-Cert Due Date 05/15/23  -AS         Timed Charges    73731 - PT Therapeutic Exercise Minutes 10  -AS      79978 - Gait Training Minutes  15  -AS         Total Minutes    Timed Charges Total Minutes 25  -AS       Total Minutes 25  -AS            User Key  (r) = Recorded By, (t) = Taken By, (c) = Cosigned By    Initials Name Provider Type    AS Minnie Jimenez, LARRY  Physical Therapist Assistant              Therapy Charges for Today     Code Description Service Date Service Provider Modifiers Qty    84168964192 HC PT THER PROC EA 15 MIN 5/6/2023 Minnie Jimenez, PTA GP 1    93463940512 HC GAIT TRAINING EA 15 MIN 5/6/2023 Minnie Jimenez, PTA GP 1    96390719948  PT THER SUPP EA 15 MIN 5/6/2023 Minnie Jimenez, LARRY GP 2          PT G-Codes  Outcome Measure Options: AM-PAC 6 Clicks Basic Mobility (PT)  AM-PAC 6 Clicks Score (PT): 16  AM-PAC 6 Clicks Score (OT): 16       Minnie Jimenez PTA  5/6/2023

## 2023-05-07 LAB
DEPRECATED RDW RBC AUTO: 46.1 FL (ref 37–54)
ERYTHROCYTE [DISTWIDTH] IN BLOOD BY AUTOMATED COUNT: 13.3 % (ref 12.3–15.4)
HCT VFR BLD AUTO: 30.3 % (ref 34–46.6)
HGB BLD-MCNC: 9.9 G/DL (ref 12–15.9)
MCH RBC QN AUTO: 30.7 PG (ref 26.6–33)
MCHC RBC AUTO-ENTMCNC: 32.7 G/DL (ref 31.5–35.7)
MCV RBC AUTO: 94.1 FL (ref 79–97)
PLATELET # BLD AUTO: 248 10*3/MM3 (ref 140–450)
PMV BLD AUTO: 10.2 FL (ref 6–12)
RBC # BLD AUTO: 3.22 10*6/MM3 (ref 3.77–5.28)
WBC NRBC COR # BLD: 12.71 10*3/MM3 (ref 3.4–10.8)

## 2023-05-07 PROCEDURE — 63710000001 MELOXICAM 15 MG TABLET: Performed by: ORTHOPAEDIC SURGERY

## 2023-05-07 PROCEDURE — 63710000001 POLYETHYLENE GLYCOL 17 G PACK: Performed by: INTERNAL MEDICINE

## 2023-05-07 PROCEDURE — 63710000001 DOCUSATE SODIUM 100 MG CAPSULE: Performed by: INTERNAL MEDICINE

## 2023-05-07 PROCEDURE — A9270 NON-COVERED ITEM OR SERVICE: HCPCS | Performed by: ORTHOPAEDIC SURGERY

## 2023-05-07 PROCEDURE — 63710000001 ONDANSETRON PER 8 MG: Performed by: ORTHOPAEDIC SURGERY

## 2023-05-07 PROCEDURE — A9270 NON-COVERED ITEM OR SERVICE: HCPCS | Performed by: INTERNAL MEDICINE

## 2023-05-07 PROCEDURE — 85027 COMPLETE CBC AUTOMATED: CPT | Performed by: ORTHOPAEDIC SURGERY

## 2023-05-07 PROCEDURE — 97116 GAIT TRAINING THERAPY: CPT

## 2023-05-07 PROCEDURE — 63710000001 MELATONIN 5 MG TABLET: Performed by: INTERNAL MEDICINE

## 2023-05-07 PROCEDURE — 63710000001 ASPIRIN EC 325 MG TABLET DELAYED-RELEASE: Performed by: ORTHOPAEDIC SURGERY

## 2023-05-07 PROCEDURE — 97535 SELF CARE MNGMENT TRAINING: CPT

## 2023-05-07 PROCEDURE — 63710000001 OXYCODONE 5 MG TABLET: Performed by: ORTHOPAEDIC SURGERY

## 2023-05-07 PROCEDURE — 63710000001 AMLODIPINE 5 MG TABLET: Performed by: INTERNAL MEDICINE

## 2023-05-07 PROCEDURE — 63710000001 BISACODYL 5 MG TABLET DELAYED-RELEASE: Performed by: INTERNAL MEDICINE

## 2023-05-07 RX ADMIN — Medication 10 ML: at 09:23

## 2023-05-07 RX ADMIN — MELOXICAM 15 MG: 15 TABLET ORAL at 09:22

## 2023-05-07 RX ADMIN — DOCUSATE SODIUM 100 MG: 100 CAPSULE, LIQUID FILLED ORAL at 20:30

## 2023-05-07 RX ADMIN — ASPIRIN 325 MG: 325 TABLET, COATED ORAL at 09:22

## 2023-05-07 RX ADMIN — Medication 2.5 MG: at 20:30

## 2023-05-07 RX ADMIN — OXYCODONE HYDROCHLORIDE 5 MG: 5 TABLET ORAL at 20:49

## 2023-05-07 RX ADMIN — POLYETHYLENE GLYCOL 3350 17 G: 17 POWDER, FOR SOLUTION ORAL at 09:22

## 2023-05-07 RX ADMIN — ONDANSETRON 4 MG: 4 TABLET ORAL at 08:11

## 2023-05-07 RX ADMIN — BISACODYL 5 MG: 5 TABLET, COATED ORAL at 13:27

## 2023-05-07 RX ADMIN — OXYCODONE HYDROCHLORIDE 5 MG: 5 TABLET ORAL at 13:27

## 2023-05-07 RX ADMIN — DOCUSATE SODIUM 100 MG: 100 CAPSULE, LIQUID FILLED ORAL at 09:22

## 2023-05-07 RX ADMIN — OXYCODONE HYDROCHLORIDE 5 MG: 5 TABLET ORAL at 04:49

## 2023-05-07 RX ADMIN — AMLODIPINE BESYLATE 5 MG: 5 TABLET ORAL at 09:22

## 2023-05-07 NOTE — THERAPY TREATMENT NOTE
Patient Name: Janet Darden  : 1945    MRN: [id number]                              Today's Date: 2023       Admit Date: 2023    Visit Dx:     ICD-10-CM ICD-9-CM   1. Primary osteoarthritis of left hip  M16.12 715.15     Patient Active Problem List   Diagnosis   • Primary osteoarthritis of left hip   • HTN (hypertension)   • S/P total left hip arthroplasty     Past Medical History:   Diagnosis Date   • Arthritis    • Hypertension    • Osteoporosis      Past Surgical History:   Procedure Laterality Date   • CHOLECYSTECTOMY     • COLONOSCOPY     • HYSTERECTOMY     • MOUTH SURGERY      jaw surgery      General Information     Row Name 23 1058          Physical Therapy Time and Intention    Document Type therapy note (daily note)  -CT     Mode of Treatment physical therapy  -CT     Row Name 23 1058          General Information    Barriers to Rehab physical barrier;ineffective coping  anxious about sorness and wB ing on left foot.  Will advance then extend left foot forward and back in walker repeatedly before stepping  -CT     Row Name 23 1058          Safety Issues, Functional Mobility    Safety Issues Affecting Function (Mobility) sequencing abilities  -CT     Impairments Affecting Function (Mobility) endurance/activity tolerance  -CT           User Key  (r) = Recorded By, (t) = Taken By, (c) = Cosigned By    Initials Name Provider Type    CT Pierre Noland, PT Physical Therapist               Mobility     Row Name 23 1100          Sit-Stand Transfer    Sit-Stand [place] (Transfers) supervision;verbal cues  -CT     Assistive Device (Sit-Stand Transfers) walker, front-wheeled  -CT     Row Name 23 1100          Gait/Stairs (Locomotion)    [place] Level (Gait) contact guard  -CT     Assistive Device (Gait) walker, front-wheeled  -CT     Distance in Feet (Gait) 50 ft w RW and CG.  -CT     Deviations/Abnormal Patterns (Gait) weight shifting  decreased;stride length decreased  -CT           User Key  (r) = Recorded By, (t) = Taken By, (c) = Cosigned By    Initials Name Provider Type    CT Pierre Noland PT Physical Therapist               Obj/Interventions     Row Name 05/07/23 1105          Range of Motion Comprehensive    General Range of Motion lower extremity range of motion deficits identified  -CT     Row Name 05/07/23 1105          Strength Comprehensive (MMT)    General Manual Muscle Testing (MMT) Assessment lower extremity strength deficits identified  -CT     Comment, General Manual Muscle Testing (MMT) Assessment reluctant to WB due to hip soreness  -CT     Row Name 05/07/23 1105          Motor Skills    Motor Skills functional endurance  -CT     Row Name 05/07/23 1105          Balance    Balance Assessment sitting static balance;sitting dynamic balance;standing static balance;standing dynamic balance  -CT     Static Sitting Balance independent  -CT     Dynamic Sitting Balance contact guard  -CT     Static Standing Balance contact guard  -CT     Dynamic Standing Balance contact guard  -CT     Position/Device Used, Standing Balance walker, rolling  -CT     Balance Interventions sitting;standing;weight shifting activity  -CT           User Key  (r) = Recorded By, (t) = Taken By, (c) = Cosigned By    Initials Name Provider Type    CT Pierre Noland, EPHRAIM Physical Therapist               Goals/Plan    No documentation.                Clinical Impression     Row Name 05/07/23 1106          Pain    Pretreatment Pain Rating 4/10  -CT     Posttreatment Pain Rating 4/10  -CT     Pain Location - Side/Orientation Left  -CT     Pain Location - hip  -CT     Pain Intervention(s) Repositioned;Ambulation/increased activity;Nursing Notified  -CT     Row Name 05/07/23 1106          Plan of Care Review    Plan of Care Reviewed With patient;spouse  -CT     Progress no change  -CT     Outcome Evaluation Gait to 50 ft soreness preventing pt from WB  ing.  Sit to stand with CG, Gt with CG due to pt movment pattern of repeated LLE flex/ext prior to WB ing and stepping  -CT     Row Name 05/07/23 1106          Therapy Assessment/Plan (PT)    Rehab Potential (PT) good, to achieve stated therapy goals  -CT     Criteria for Skilled Interventions Met (PT) yes  -CT     Therapy Frequency (PT) 2 times/day  -CT     Row Name 05/07/23 1106          Positioning and Restraints    Pre-Treatment Position sitting in chair/recliner  -CT     Post Treatment Position chair  -CT     In Chair notified nsg;reclined;sitting;call light within reach;exit alarm on;encouraged to call for assist;with family/caregiver;legs elevated  -CT           User Key  (r) = Recorded By, (t) = Taken By, (c) = Cosigned By    Initials Name Provider Type    CT Pierre Noland, PT Physical Therapist               Outcome Measures     Row Name 05/07/23 1109          How much help from another person do you currently need...    Turning from your back to your side while in flat bed without using bedrails? 3  -CT     Moving from lying on back to sitting on the side of a flat bed without bedrails? 3  -CT     Moving to and from a bed to a chair (including a wheelchair)? 3  -CT     Standing up from a chair using your arms (e.g., wheelchair, bedside chair)? 3  -CT     Climbing 3-5 steps with a railing? 2  -CT     To walk in hospital room? 3  -CT     AM-PAC 6 Clicks Score (PT) 17  -CT     Highest level of mobility 5 --> Static standing  -CT     Row Name 05/07/23 1109          PADD    Diagnosis 2  -CT     Gender 1  -CT     Age Group 0  -CT     Gait Distance 0  -CT     Assist Level 1  -CT     Home Support 3  -CT     PADD Score 7  -CT     Patient Preference extended rehabilitation  -CT     Prediction by PADD Score extended rehabilitation  -CT     Row Name 05/07/23 0905          Functional Assessment    Outcome Measure Options AM-PAC 6 Clicks Daily Activity (OT)  -TB           User Key  (r) = Recorded By, (t) =  Taken By, (c) = Cosigned By    Initials Name Provider Type    Valery Alicia, OT Occupational Therapist    Pierre Tam, PT Physical Therapist                             Physical Therapy Education     Title: PT OT SLP Therapies (In Progress)     Topic: Physical Therapy (In Progress)     Point: Mobility training (In Progress)     Learning Progress Summary           Patient Acceptance, E,D, NR by CT at 5/7/2023 1110    Acceptance, E,TB, VU by TB at 5/6/2023 2030    Acceptance, E, NR by AS at 5/6/2023 1439    Acceptance, E,D, VU,NR by AB at 5/5/2023 1644   Family Acceptance, E,D, NR by CT at 5/7/2023 1110    Acceptance, E,D, VU,NR by AB at 5/5/2023 1644                   Point: Home exercise program (In Progress)     Learning Progress Summary           Patient Acceptance, E,D, NR by CT at 5/7/2023 1110    Acceptance, E,TB, VU by TB at 5/6/2023 2030    Acceptance, E, NR by AS at 5/6/2023 1439    Acceptance, E,D, VU,NR by AB at 5/5/2023 1644   Family Acceptance, E,D, NR by CT at 5/7/2023 1110    Acceptance, E,D, VU,NR by AB at 5/5/2023 1644                   Point: Body mechanics (In Progress)     Learning Progress Summary           Patient Acceptance, E,D, NR by CT at 5/7/2023 1110    Acceptance, E,TB, VU by TB at 5/6/2023 2030    Acceptance, E, NR by AS at 5/6/2023 1439    Acceptance, E,D, VU,NR by AB at 5/5/2023 1644   Family Acceptance, E,D, NR by CT at 5/7/2023 1110    Acceptance, E,D, VU,NR by AB at 5/5/2023 1644                   Point: Precautions (In Progress)     Learning Progress Summary           Patient Acceptance, E,D, NR by CT at 5/7/2023 1110    Acceptance, E,TB, VU by TB at 5/6/2023 2030    Acceptance, E, NR by AS at 5/6/2023 1439    Acceptance, E,D, VU,NR by AB at 5/5/2023 1644   Family Acceptance, E,D, NR by CT at 5/7/2023 1110    Acceptance, E,D, VU,NR by AB at 5/5/2023 1644                               User Key     Initials Effective Dates Name Provider Type Discipline     AS 04/28/23 -  Minnie Jimenez, PTA Physical Therapist Assistant PT    CT 02/03/23 -  Pierre Noland, PT Physical Therapist PT    TB 03/02/23 -  Samantha Vasquez, RN Registered Nurse Nurse    AB 09/22/22 -  Minnie Mccormick, PT Physical Therapist PT              PT Recommendation and Plan     Plan of Care Reviewed With: patient, spouse  Progress: no change  Outcome Evaluation: Gait to 50 ft soreness preventing pt from WB ing.  Sit to stand with CG, Gt with CG due to pt movment pattern of repeated LLE flex/ext prior to WB ing and stepping     Time Calculation:    PT Charges     Row Name 05/07/23 1110             Time Calculation    Start Time 1035  -CT      PT Received On 05/07/23  -CT         Time Calculation- PT    Total Timed Code Minutes- PT 30 minute(s)  -CT            User Key  (r) = Recorded By, (t) = Taken By, (c) = Cosigned By    Initials Name Provider Type    CT Pierre Noland, PT Physical Therapist              Therapy Charges for Today     Code Description Service Date Service Provider Modifiers Qty    76984073510 HC GAIT TRAINING EA 15 MIN 5/7/2023 Pierre Noland, PT GP 2          PT G-Codes  Outcome Measure Options: AM-PAC 6 Clicks Daily Activity (OT)  AM-PAC 6 Clicks Score (PT): 17  AM-PAC 6 Clicks Score (OT): 16  PT Discharge Summary  Anticipated Discharge Disposition (PT): inpatient rehabilitation facility    Pierre Noland, PT  5/7/2023

## 2023-05-07 NOTE — PROGRESS NOTES
Orthopedic Daily Progress Note      CC: Pain    Pain controlled  General: no fevers, chills  Abdomen: No nausea, vomiting, or diarrhea    No other complaints    Physical Exam:  I have reviewed the vital signs.  Temp:  [98.2 °F (36.8 °C)-98.6 °F (37 °C)] 98.3 °F (36.8 °C)  Heart Rate:  [] 95  Resp:  [18] 18  BP: (115-148)/(54-79) 125/59    Objective  General Appearance:    Alert, cooperative, no distress  Extremities: No clubbing, cyanosis, or edema to lower extremities  Pulses:  2+ in distal surgical extremity  Skin: Incision Clean/dry/intact      Results Review:    I have reviewed the labs, radiology results and diagnostic studies:    Results from last 7 days   Lab Units 05/07/23  0930   WBC 10*3/mm3 12.71*   HEMOGLOBIN g/dL 9.9*   PLATELETS 10*3/mm3 248     Results from last 7 days   Lab Units 05/06/23  0521   SODIUM mmol/L 136   POTASSIUM mmol/L 4.7   CO2 mmol/L 28.0   CREATININE mg/dL 0.88   GLUCOSE mg/dL 112*       I have reviewed the medications.    Assessment/Problem List  POD# 2 S/p left total hip arthroplasty    Plan  PT/OT  Placement when available      Karson Ordonez MD  05/07/23  11:34 EDT

## 2023-05-07 NOTE — PLAN OF CARE
Problem: Adult Inpatient Plan of Care  Goal: Plan of Care Review  Recent Flowsheet Documentation  Taken 5/7/2023 0901 by Valery Banegas OT  Progress: improving  Plan of Care Reviewed With:   patient   spouse  Outcome Evaluation: Pt participates in therapy with good effort. Pt reports good night sleep and improved pain. Demonstrates improved bed mobility using AE to advance LLE to EOB. Transfering with Min Ax1 using RW. Education reviewed for precautions and ADL retraining. Demonstrates good understanding for use of AE to support LB ADL independence. Pt remains below her baseline, OT will continue to follow IP. Continue to recommend SNF at d/c for best outcome.

## 2023-05-07 NOTE — PLAN OF CARE
Goal Outcome Evaluation:      Pt able to ambulate to BC (due to urge incontinence) several times with 1 assist, GB, and walker. RA/2L w/ sleep. VSS. Pain well controlled with cold therapy and administration of PRN pain medication. Prineo dressing CDI.

## 2023-05-07 NOTE — PLAN OF CARE
Goal Outcome Evaluation:  Plan of Care Reviewed With: patient, spouse        Progress: no change  Outcome Evaluation: Gait to 50 ft soreness preventing pt from WB ing.  Sit to stand with CG, Gt with CG due to pt movment pattern of repeated LLE flex/ext prior to WB ing and stepping

## 2023-05-07 NOTE — THERAPY TREATMENT NOTE
Patient Name: Janet Darden  : 1945    MRN: [id number]                              Today's Date: 2023       Admit Date: 2023    Visit Dx:     ICD-10-CM ICD-9-CM   1. Primary osteoarthritis of left hip  M16.12 715.15     Patient Active Problem List   Diagnosis   • Primary osteoarthritis of left hip   • HTN (hypertension)   • S/P total left hip arthroplasty     Past Medical History:   Diagnosis Date   • Arthritis    • Hypertension    • Osteoporosis      Past Surgical History:   Procedure Laterality Date   • CHOLECYSTECTOMY     • COLONOSCOPY     • HYSTERECTOMY     • MOUTH SURGERY      jaw surgery      General Information     Row Name 23 0854          OT Time and Intention    Document Type therapy note (daily note)  -TB     Mode of Treatment occupational therapy;individual therapy  -TB     Row Name 23 0854          General Information    Patient Profile Reviewed yes  -TB     Existing Precautions/Restrictions fall;left;hip, anterior  s/p L ARNOLD anterior modified  -TB     Barriers to Rehab previous functional deficit  -TB     Row Name 23 0854          Cognition    Orientation Status (Cognition) oriented x 4  -TB     Row Name 23 0854          Safety Issues, Functional Mobility    Safety Issues Affecting Function (Mobility) insight into deficits/self-awareness;awareness of need for assistance;safety precaution awareness;safety precautions follow-through/compliance;positioning of assistive device;sequencing abilities  -TB     Impairments Affecting Function (Mobility) balance;endurance/activity tolerance;pain;strength;postural/trunk control;range of motion (ROM)  -TB           User Key  (r) = Recorded By, (t) = Taken By, (c) = Cosigned By    Initials Name Provider Type    TB Valery Banegas OT Occupational Therapist                 Mobility/ADL's     Row Name 23 0855          Bed Mobility    Bed Mobility supine-sit;scooting/bridging  -TB      Scooting/Bridging [place] (Bed Mobility) moderate assist (50% patient effort);1 person to manage equipment;verbal cues  -TB     Supine-Sit [place] (Bed Mobility) moderate assist (50% patient effort);1 person to manage equipment;verbal cues  -TB     Bed Mobility, Safety Issues decreased use of legs for bridging/pushing;decreased use of arms for pushing/pulling;impaired trunk control for bed mobility  -TB     Assistive Device (Bed Mobility) draw sheet;bed rails  -TB     Comment, (Bed Mobility) Cues and assist for sequencing. Pt demonstrates improved use of leg  to advance LLE to EOB.  -TB     Row Name 05/07/23 0855          Transfers    Transfers sit-stand transfer;stand-sit transfer;toilet transfer;bed-chair transfer  -TB     Row Name 05/07/23 0855          Bed-Chair Transfer    Bed-Chair [place] (Transfers) minimum assist (75% patient effort);1 person assist;verbal cues  -TB     Assistive Device (Bed-Chair Transfers) walker, front-wheeled  -TB     Row Name 05/07/23 0855          Sit-Stand Transfer    Sit-Stand [place] (Transfers) verbal cues;minimum assist (75% patient effort);1 person assist  -TB     Assistive Device (Sit-Stand Transfers) walker, front-wheeled  -TB     Row Name 05/07/23 0855          Stand-Sit Transfer    Stand-Sit [place] (Transfers) minimum assist (75% patient effort);1 person assist;verbal cues  -TB     Assistive Device (Stand-Sit Transfers) walker, front-wheeled  -TB     Row Name 05/07/23 0855          Toilet Transfer    Type (Toilet Transfer) sit-stand;stand-sit  -TB     [place] Level (Toilet Transfer) minimum assist (75% patient effort);1 person assist;verbal cues  -TB     Assistive Device (Toilet Transfer) commode, bedside without drop arms;walker, front-wheeled  -TB     Row Name 05/07/23 0855          Activities of Daily Living    BADL Assessment/Intervention bathing;upper body dressing;lower body dressing;grooming;feeding;toileting  -TB     Row Name  05/07/23 0855          Mobility    Extremity Weight-bearing Status left lower extremity  -TB     Left Lower Extremity (Weight-bearing Status) weight-bearing as tolerated (WBAT)  -TB     Row Name 05/07/23 0855          Bathing Assessment/Intervention    [place] Level (Bathing) set up;distal lower extremities/feet  -TB     Position (Bathing) unsupported sitting  -TB     Comment, (Bathing) Education reviewed for precautions and ADL retraining  -TB     Row Name 05/07/23 0855          Lower Body Dressing Assessment/Training    [place] Level (Lower Body Dressing) doff;don;socks;moderate assist (50% patient effort);verbal cues  -TB     Position (Lower Body Dressing) unsupported standing  -TB     Comment, (Lower Body Dressing) Education reviewed for precautions and ADL retraining. Good effort. Follow up teaching recommended for competence.  -TB     Row Name 05/07/23 0855          Grooming Assessment/Training    [place] Level (Grooming) set up;wash face, hands;hair care, combing/brushing  -TB     Position (Grooming) supported sitting  -TB     Row Name 05/07/23 0855          Toileting Assessment/Training    [place] Level (Toileting) maximum assist (25% patient effort);adjust/manage clothing;set up;perform perineal hygiene  -TB     Position (Toileting) unsupported sitting;supported standing  -TB     Row Name 05/07/23 0855          Self-Feeding Assessment/Training    [place] Level (Feeding) independent;liquids to mouth  -TB     Position (Self-Feeding) supported sitting  -TB     Row Name 05/07/23 0855          Upper Body Dressing Assessment/Training    [place] Level (Upper Body Dressing) don;pajama/robe;set up  -TB     Position (Upper Body Dressing) unsupported standing  -TB           User Key  (r) = Recorded By, (t) = Taken By, (c) = Cosigned By    Initials Name Provider Type    Valery Alicia OT Occupational Therapist               Obj/Interventions     Row Name 05/07/23 0900           Balance    Balance Assessment sitting dynamic balance;sit to stand dynamic balance;standing dynamic balance  -TB     Dynamic Sitting Balance supervision  -TB     Position, Sitting Balance unsupported;sitting in chair;sitting edge of bed  BSC  -TB     Sit to Stand Dynamic Balance minimal assist;1-person assist;verbal cues  -TB     Dynamic Standing Balance minimal assist;1-person assist;verbal cues  -TB     Position/Device Used, Standing Balance supported;walker, front-wheeled  -TB     Balance Interventions sitting;standing;sit to stand;supported;dynamic;dynamic reaching;occupation based/functional task;UE activity with balance activity  -TB           User Key  (r) = Recorded By, (t) = Taken By, (c) = Cosigned By    Initials Name Provider Type    TB Valery Banegas OT Occupational Therapist               Goals/Plan    No documentation.                Clinical Impression     Row Name 05/07/23 0901          Pain Assessment    Pretreatment Pain Rating 4/10  -TB     Posttreatment Pain Rating 4/10  -TB     Pain Location - Side/Orientation Left  -TB     Pain Location generalized  -TB     Pain Location - hip  -TB     Pre/Posttreatment Pain Comment Pt tolerates activity well  -TB     Pain Intervention(s) Ambulation/increased activity;Repositioned;Cold applied  -TB     Row Name 05/07/23 0901          Plan of Care Review    Plan of Care Reviewed With patient;spouse  -TB     Progress improving  -TB     Outcome Evaluation Pt participates in therapy with good effort. Pt reports good night sleep and improved pain. Demonstrates improved bed mobility using AE to advance LLE to EOB. Transfering with Min Ax1 using RW. Education reviewed for precautions and ADL retraining. Demonstrates good understanding for use of AE to support LB ADL independence. Pt remains below her baseline, OT will continue to follow IP. Continue to recommend SNF at d/c for best outcome.  -TB     Row Name 05/07/23 0901          Therapy Plan  Review/Discharge Plan (OT)    Anticipated Discharge Disposition (OT) skilled nursing facility  -TB     Row Name 05/07/23 0901          Vital Signs    Pre Systolic BP Rehab --  RN cleared OT  -TB     Pre SpO2 (%) 98  -TB     O2 Delivery Pre Treatment nasal cannula  -TB     Post SpO2 (%) 95  -TB     O2 Delivery Post Treatment room air  -TB     Pre Patient Position Supine  -TB     Intra Patient Position Standing  -TB     Post Patient Position Sitting  -TB     Row Name 05/07/23 0901          Positioning and Restraints    Pre-Treatment Position in bed  -TB     Post Treatment Position chair  -TB     In Chair notified nsg;reclined;call light within reach;encouraged to call for assist;exit alarm on;with family/caregiver;waffle cushion;legs elevated  -TB           User Key  (r) = Recorded By, (t) = Taken By, (c) = Cosigned By    Initials Name Provider Type    Valery Alicia OT Occupational Therapist               Outcome Measures     Row Name 05/07/23 0905          How much help from another is currently needed...    Putting on and taking off regular lower body clothing? 2  -TB     Bathing (including washing, rinsing, and drying) 2  -TB     Toileting (which includes using toilet bed pan or urinal) 2  -TB     Putting on and taking off regular upper body clothing 3  -TB     Taking care of personal grooming (such as brushing teeth) 3  -TB     Eating meals 4  -TB     AM-PAC 6 Clicks Score (OT) 16  -TB     Row Name 05/07/23 0905          Functional Assessment    Outcome Measure Options AM-PAC 6 Clicks Daily Activity (OT)  -TB           User Key  (r) = Recorded By, (t) = Taken By, (c) = Cosigned By    Initials Name Provider Type    Valery Alicia OT Occupational Therapist                Occupational Therapy Education     Title: PT OT SLP Therapies (Done)     Topic: Occupational Therapy (Done)     Point: ADL training (Done)     Description:   Instruct learner(s) on proper safety adaptation and remediation  techniques during self care or transfers.   Instruct in proper use of assistive devices.              Learning Progress Summary           Patient Acceptance, E,D, VU,DU,NR by TB at 5/7/2023 0905    Acceptance, E,TB, VU by TB1 at 5/6/2023 2030    Acceptance, E,D, VU,NR by TB at 5/6/2023 0857    Comment: Limted by pain at IE. Recommend follow up ADL/AE teaching and trial.   Family Acceptance, E,D, VU,DU,NR by TB at 5/7/2023 0905                   Point: Home exercise program (Done)     Description:   Instruct learner(s) on appropriate technique for monitoring, assisting and/or progressing therapeutic exercises/activities.              Learning Progress Summary           Patient Acceptance, E,TB, VU by 1 at 5/6/2023 2030                   Point: Precautions (Done)     Description:   Instruct learner(s) on prescribed precautions during self-care and functional transfers.              Learning Progress Summary           Patient Acceptance, E,D, VU,DU,NR by TB at 5/7/2023 0905    Acceptance, E,TB, VU by 1 at 5/6/2023 2030    Acceptance, E,D, VU,NR by TB at 5/6/2023 0857    Comment: Limted by pain at IE. Recommend follow up ADL/AE teaching and trial.   Family Acceptance, E,D, VU,DU,NR by  at 5/7/2023 0905                   Point: Body mechanics (Done)     Description:   Instruct learner(s) on proper positioning and spine alignment during self-care, functional mobility activities and/or exercises.              Learning Progress Summary           Patient Acceptance, E,TB, VU by [facility] at 5/6/2023 2030                               User Key     Initials Effective Dates Name Provider Type Discipline    TB 02/03/23 -  Valery Banegas OT Occupational Therapist OT    TB1 03/02/23 -  Samantha Vasquez RN Registered Nurse Nurse              OT Recommendation and Plan  Therapy Frequency (OT): daily  Plan of Care Review  Plan of Care Reviewed With: patient, spouse  Progress: improving  Outcome Evaluation: Pt participates in  therapy with good effort. Pt reports good night sleep and improved pain. Demonstrates improved bed mobility using AE to advance LLE to EOB. Transfering with Min Ax1 using RW. Education reviewed for precautions and ADL retraining. Demonstrates good understanding for use of AE to support LB ADL independence. Pt remains below her baseline, OT will continue to follow IP. Continue to recommend SNF at d/c for best outcome.     Time Calculation:    Time Calculation- OT     Row Name 05/07/23 0745             Time Calculation- OT    OT Start Time 0745  -TB      OT Received On 05/07/23  -TB      OT Goal Re-Cert Due Date 05/16/23  -TB         Timed Charges    58897 - OT Self Care/Mgmt Minutes 33  -TB         Total Minutes    Timed Charges Total Minutes 33  -TB       Total Minutes 33  -TB            User Key  (r) = Recorded By, (t) = Taken By, (c) = Cosigned By    Initials Name Provider Type    TB Valery Banegas, OT Occupational Therapist              Therapy Charges for Today     Code Description Service Date Service Provider Modifiers Qty    71363514124 HC OT SELF CARE/MGMT/TRAIN EA 15 MIN 5/6/2023 Valery Banegas OT GO 2    92193649746 HC OT EVAL MOD COMPLEXITY 3 5/6/2023 Valery Bnaegas OT GO 1    56405511654 HC OT SELF CARE/MGMT/TRAIN EA 15 MIN 5/7/2023 Valery Banegas, OT GO 2               Valery Banegas OT  5/7/2023

## 2023-05-07 NOTE — PLAN OF CARE
Goal Outcome Evaluation:  Plan of Care Reviewed With: patient           Outcome Evaluation: Ox4, up in chair most of the day, pain controlled well with oxycodone and ice, ambulated 3 times on dayshift, 40-60ft each time, VSS. Voiding and eating well, cont to monitor

## 2023-05-07 NOTE — THERAPY TREATMENT NOTE
Patient Name: Janet Darden  : 1945    MRN: [id number]                              Today's Date: 2023       Admit Date: 2023    Visit Dx:     ICD-10-CM ICD-9-CM   1. Primary osteoarthritis of left hip  M16.12 715.15     Patient Active Problem List   Diagnosis   • Primary osteoarthritis of left hip   • HTN (hypertension)   • S/P total left hip arthroplasty     Past Medical History:   Diagnosis Date   • Arthritis    • Hypertension    • Osteoporosis      Past Surgical History:   Procedure Laterality Date   • CHOLECYSTECTOMY     • COLONOSCOPY     • HYSTERECTOMY     • MOUTH SURGERY      jaw surgery      General Information     Row Name 23 1555 23 1058       Physical Therapy Time and Intention    Document Type therapy note (daily note)  -CT therapy note (daily note)  -CT    Mode of Treatment physical therapy  -CT physical therapy  -CT    Row Name 23 1555 23 1058       General Information    Patient Profile Reviewed yes  -CT --    Existing Precautions/Restrictions fall;hip  -CT --    Barriers to Rehab physical barrier;ineffective coping  -CT physical barrier;ineffective coping  anxious about sorness and wB ing on left foot.  Will advance then extend left foot forward and back in walker repeatedly before stepping  -CT    Row Name 23 1555 23 1051       Safety Issues, Functional Mobility    Safety Issues Affecting Function (Mobility) -- sequencing abilities  -CT    Impairments Affecting Function (Mobility) endurance/activity tolerance  -CT endurance/activity tolerance  -CT          User Key  (r) = Recorded By, (t) = Taken By, (c) = Cosigned By    Initials Name Provider Type    CT Pierre Noland, PT Physical Therapist               Mobility     Row Name 23 1554          Bed Mobility    Bed Mobility bed mobility (all) activities  -CT     All Activities, [place] (Bed Mobility) modified independence  -CT     Scooting/Bridging [place]  (Bed Mobility) minimum assist (75% patient effort)  -CT     Row Name 05/07/23 1556          Bed-Chair Transfer    Bed-Chair [place] (Transfers) modified independence  -CT     Assistive Device (Bed-Chair Transfers) walker, front-wheeled  -CT     Row Name 05/07/23 1556 05/07/23 1100       Sit-Stand Transfer    Sit-Stand [place] (Transfers) independent  -CT supervision;verbal cues  -CT    Assistive Device (Sit-Stand Transfers) walker, front-wheeled  -CT walker, front-wheeled  -CT    Row Name 05/07/23 1556 05/07/23 1100       Gait/Stairs (Locomotion)    [place] Level (Gait) supervision  -CT contact guard  -CT    Assistive Device (Gait) walker, front-wheeled  -CT walker, front-wheeled  -CT    Distance in Feet (Gait) 70 ft with RW cues for Wb ing and sequence  -CT 50 ft w RW and CG.  -CT    Deviations/Abnormal Patterns (Gait) gait speed decreased;stride length decreased  -CT weight shifting decreased;stride length decreased  -CT          User Key  (r) = Recorded By, (t) = Taken By, (c) = Cosigned By    Initials Name Provider Type    CT Pierre Noland, PT Physical Therapist               Obj/Interventions     Row Name 05/07/23 1105          Range of Motion Comprehensive    General Range of Motion lower extremity range of motion deficits identified  -CT     Row Name 05/07/23 1557 05/07/23 1105       Strength Comprehensive (MMT)    General Manual Muscle Testing (MMT) Assessment lower extremity strength deficits identified  -CT lower extremity strength deficits identified  -CT    Comment, General Manual Muscle Testing (MMT) Assessment 4/5  -CT reluctant to WB due to hip soreness  -CT    Row Name 05/07/23 1557 05/07/23 1105       Motor Skills    Motor Skills functional endurance  -CT functional endurance  -CT    Row Name 05/07/23 1557 05/07/23 1105       Balance    Balance Assessment sitting static balance;sitting dynamic balance;standing static balance;standing dynamic balance  -CT sitting static  balance;sitting dynamic balance;standing static balance;standing dynamic balance  -CT    Static Sitting Balance independent  -CT independent  -CT    Dynamic Sitting Balance supervision  -CT contact guard  -CT    Static Standing Balance modified independence  -CT contact guard  -CT    Dynamic Standing Balance supervision  -CT contact guard  -CT    Position/Device Used, Standing Balance supported;unsupported;walker, front-wheeled  -CT walker, rolling  -CT    Balance Interventions sitting;standing;sit to stand;weight shifting activity  -CT sitting;standing;weight shifting activity  -CT          User Key  (r) = Recorded By, (t) = Taken By, (c) = Cosigned By    Initials Name Provider Type    CT Pierre Noland, PT Physical Therapist               Goals/Plan    No documentation.                Clinical Impression     Row Name 05/07/23 1558 05/07/23 1106       Pain    Pretreatment Pain Rating 2/10  -CT 4/10  -CT    Posttreatment Pain Rating 2/10  -CT 4/10  -CT    Pain Location - Side/Orientation -- Left  -CT    Pain Location incisional  -CT --    Pain Location - hip  -CT hip  -CT    Pain Intervention(s) Ambulation/increased activity;Repositioned  -CT Repositioned;Ambulation/increased activity;Nursing Notified  -CT    Row Name 05/07/23 1558 05/07/23 1106       Plan of Care Review    Plan of Care Reviewed With patient;spouse  -CT patient;spouse  -CT    Progress improving  -CT no change  -CT    Outcome Evaluation 70 ft this pm sequence still incorrect but safe with reluctance to wb  -CT Gait to 50 ft soreness preventing pt from WB ing.  Sit to stand with CG, Gt with CG due to pt movment pattern of repeated LLE flex/ext prior to WB ing and stepping  -CT    Row Name 05/07/23 1558 05/07/23 1106       Therapy Assessment/Plan (PT)    Rehab Potential (PT) good, to achieve stated therapy goals  -CT good, to achieve stated therapy goals  -CT    Criteria for Skilled Interventions Met (PT) yes  -CT yes  -CT    Therapy Frequency  (PT) 2 times/day  -CT 2 times/day  -CT    Row Name 05/07/23 1558 05/07/23 1106       Positioning and Restraints    Pre-Treatment Position sitting in chair/recliner  -CT sitting in chair/recliner  -CT    Post Treatment Position bed  -CT chair  -CT    In Bed notified nsg;supine;call light within reach;encouraged to call for assist;exit alarm on  -CT --    In Chair -- notified nsg;reclined;sitting;call light within reach;exit alarm on;encouraged to call for assist;with family/caregiver;legs elevated  -CT          User Key  (r) = Recorded By, (t) = Taken By, (c) = Cosigned By    Initials Name Provider Type    CT Pierre Noland, PT Physical Therapist               Outcome Measures     Row Name 05/07/23 1600 05/07/23 1109       How much help from another person do you currently need...    Turning from your back to your side while in flat bed without using bedrails? 3  -CT 3  -CT    Moving from lying on back to sitting on the side of a flat bed without bedrails? 3  -CT 3  -CT    Moving to and from a bed to a chair (including a wheelchair)? 3  -CT 3  -CT    Standing up from a chair using your arms (e.g., wheelchair, bedside chair)? 3  -CT 3  -CT    Climbing 3-5 steps with a railing? 2  -CT 2  -CT    To walk in hospital room? 3  -CT 3  -CT    AM-PAC 6 Clicks Score (PT) 17  -CT 17  -CT    Highest level of mobility 5 --> Static standing  -CT 5 --> Static standing  -CT    Row Name 05/07/23 0811          How much help from another person do you currently need...    Turning from your back to your side while in flat bed without using bedrails? 3  -MW     Moving from lying on back to sitting on the side of a flat bed without bedrails? 3  -MW     Moving to and from a bed to a chair (including a wheelchair)? 3  -MW     Standing up from a chair using your arms (e.g., wheelchair, bedside chair)? 3  -MW     Climbing 3-5 steps with a railing? 3  -MW     To walk in hospital room? 3  -MW     AM-PAC 6 Clicks Score (PT) 18  -MW      Highest level of mobility 6 --> Walked 10 steps or more  -MW     Row Name 05/07/23 1600 05/07/23 1109       PADD    Diagnosis 2  -CT 2  -CT    Gender 1  -CT 1  -CT    Age Group 0  -CT 0  -CT    Gait Distance 0  -CT 0  -CT    Assist Level 2  -CT 1  -CT    Home Support 3  -CT 3  -CT    PADD Score 8  -CT 7  -CT    Patient Preference extended rehabilitation  -CT extended rehabilitation  -CT    Prediction by PADD Score directly home (with home health or out-patient rehab)  -CT extended rehabilitation  -CT    Row Name 05/07/23 1600 05/07/23 0905       Functional Assessment    Outcome Measure Options AM-PAC 6 Clicks Basic Mobility (PT)  -CT AM-PAC 6 Clicks Daily Activity (OT)  -TB          User Key  (r) = Recorded By, (t) = Taken By, (c) = Cosigned By    Initials Name Provider Type    TB Valery Banegas, OT Occupational Therapist    Pierre Tam, PT Physical Therapist    Lena Remy, RN Registered Nurse                             Physical Therapy Education     Title: PT OT SLP Therapies (In Progress)     Topic: Physical Therapy (In Progress)     Point: Mobility training (In Progress)     Learning Progress Summary           Patient Acceptance, E,D, NR by CT at 5/7/2023 1601    Acceptance, E,D, NR by CT at 5/7/2023 1110    Acceptance, E,TB, VU by TB at 5/6/2023 2030    Acceptance, E, NR by AS at 5/6/2023 1439    Acceptance, E,D, VU,NR by AB at 5/5/2023 1644   Family Acceptance, E,D, NR by CT at 5/7/2023 1601    Acceptance, E,D, NR by CT at 5/7/2023 1110    Acceptance, E,D, VU,NR by AB at 5/5/2023 1644                   Point: Home exercise program (In Progress)     Learning Progress Summary           Patient Acceptance, E,D, NR by CT at 5/7/2023 1601    Acceptance, E,D, NR by CT at 5/7/2023 1110    Acceptance, E,TB, VU by TB at 5/6/2023 2030    Acceptance, E, NR by AS at 5/6/2023 1439    Acceptance, E,D, VU,NR by AB at 5/5/2023 1644   Family Acceptance, E,D, NR by CT at 5/7/2023 1601    Acceptance,  E,D, NR by CT at 5/7/2023 1110    Acceptance, E,D, VU,NR by AB at 5/5/2023 1644                   Point: Body mechanics (In Progress)     Learning Progress Summary           Patient Acceptance, E,D, NR by CT at 5/7/2023 1601    Acceptance, E,D, NR by CT at 5/7/2023 1110    Acceptance, E,TB, VU by TB at 5/6/2023 2030    Acceptance, E, NR by AS at 5/6/2023 1439    Acceptance, E,D, VU,NR by AB at 5/5/2023 1644   Family Acceptance, E,D, NR by CT at 5/7/2023 1601    Acceptance, E,D, NR by CT at 5/7/2023 1110    Acceptance, E,D, VU,NR by AB at 5/5/2023 1644                   Point: Precautions (In Progress)     Learning Progress Summary           Patient Acceptance, E,D, NR by CT at 5/7/2023 1601    Acceptance, E,D, NR by CT at 5/7/2023 1110    Acceptance, E,TB, VU by TB at 5/6/2023 2030    Acceptance, E, NR by AS at 5/6/2023 1439    Acceptance, E,D, VU,NR by AB at 5/5/2023 1644   Family Acceptance, E,D, NR by CT at 5/7/2023 1601    Acceptance, E,D, NR by CT at 5/7/2023 1110    Acceptance, E,D, VU,NR by AB at 5/5/2023 1644                               User Key     Initials Effective Dates Name Provider Type Discipline    AS 04/28/23 -  Minnie Jimenez, PTA Physical Therapist Assistant PT    CT 02/03/23 -  Pierre Noland, PT Physical Therapist PT    TB 03/02/23 -  Samantha Vasquez, RN Registered Nurse Nurse    AB 09/22/22 -  Minnie Mccormick, PT Physical Therapist PT              PT Recommendation and Plan     Plan of Care Reviewed With: patient, spouse  Progress: improving  Outcome Evaluation: 70 ft this pm sequence still incorrect but safe with reluctance to wb     Time Calculation:    PT Charges     Row Name 05/07/23 1601 05/07/23 1110          Time Calculation    Start Time 1430  -CT 1035  -CT     PT Received On 05/07/23  -CT 05/07/23  -CT        Time Calculation- PT    Total Timed Code Minutes- PT 30 minute(s)  -CT 30 minute(s)  -CT           User Key  (r) = Recorded By, (t) = Taken By, (c) = Cosigned By     Initials Name Provider Type    CT Pierre Noland, PT Physical Therapist              Therapy Charges for Today     Code Description Service Date Service Provider Modifiers Qty    18866758164 HC GAIT TRAINING EA 15 MIN 5/7/2023 Pierre Noland, PT GP 2    30793679102 HC GAIT TRAINING EA 15 MIN 5/7/2023 Pierre Noland, PT GP 2          PT G-Codes  Outcome Measure Options: AM-PAC 6 Clicks Basic Mobility (PT)  AM-PAC 6 Clicks Score (PT): 17  AM-PAC 6 Clicks Score (OT): 16  PT Discharge Summary  Anticipated Discharge Disposition (PT): inpatient rehabilitation facility, home with home health    Pierre Noland, PT  5/7/2023

## 2023-05-07 NOTE — PLAN OF CARE
Goal Outcome Evaluation:  Plan of Care Reviewed With: patient, spouse        Progress: improving  Outcome Evaluation: 70 ft this pm sequence still incorrect but safe with reluctance to wb

## 2023-05-08 VITALS
HEART RATE: 88 BPM | DIASTOLIC BLOOD PRESSURE: 66 MMHG | OXYGEN SATURATION: 97 % | TEMPERATURE: 97.9 F | RESPIRATION RATE: 18 BRPM | SYSTOLIC BLOOD PRESSURE: 122 MMHG

## 2023-05-08 PROBLEM — G89.18 POSTOPERATIVE PAIN: Status: ACTIVE | Noted: 2023-05-08

## 2023-05-08 LAB
DEPRECATED RDW RBC AUTO: 45.5 FL (ref 37–54)
ERYTHROCYTE [DISTWIDTH] IN BLOOD BY AUTOMATED COUNT: 13.2 % (ref 12.3–15.4)
HCT VFR BLD AUTO: 27.8 % (ref 34–46.6)
HGB BLD-MCNC: 9.1 G/DL (ref 12–15.9)
MCH RBC QN AUTO: 30.7 PG (ref 26.6–33)
MCHC RBC AUTO-ENTMCNC: 32.7 G/DL (ref 31.5–35.7)
MCV RBC AUTO: 93.9 FL (ref 79–97)
PLATELET # BLD AUTO: 233 10*3/MM3 (ref 140–450)
PMV BLD AUTO: 10.3 FL (ref 6–12)
RBC # BLD AUTO: 2.96 10*6/MM3 (ref 3.77–5.28)
SARS-COV-2 AG RESP QL IA.RAPID: NORMAL
WBC NRBC COR # BLD: 10.42 10*3/MM3 (ref 3.4–10.8)

## 2023-05-08 PROCEDURE — 63710000001 AMLODIPINE 5 MG TABLET: Performed by: INTERNAL MEDICINE

## 2023-05-08 PROCEDURE — 87426 SARSCOV CORONAVIRUS AG IA: CPT | Performed by: INTERNAL MEDICINE

## 2023-05-08 PROCEDURE — 99024 POSTOP FOLLOW-UP VISIT: CPT | Performed by: ORTHOPAEDIC SURGERY

## 2023-05-08 PROCEDURE — 63710000001 POLYETHYLENE GLYCOL 17 G PACK: Performed by: INTERNAL MEDICINE

## 2023-05-08 PROCEDURE — A9270 NON-COVERED ITEM OR SERVICE: HCPCS | Performed by: INTERNAL MEDICINE

## 2023-05-08 PROCEDURE — 63710000001 MELOXICAM 15 MG TABLET: Performed by: ORTHOPAEDIC SURGERY

## 2023-05-08 PROCEDURE — 63710000001 ASPIRIN EC 325 MG TABLET DELAYED-RELEASE: Performed by: ORTHOPAEDIC SURGERY

## 2023-05-08 PROCEDURE — A9270 NON-COVERED ITEM OR SERVICE: HCPCS | Performed by: ORTHOPAEDIC SURGERY

## 2023-05-08 PROCEDURE — 63710000001 BISACODYL 5 MG TABLET DELAYED-RELEASE: Performed by: INTERNAL MEDICINE

## 2023-05-08 PROCEDURE — 63710000001 DOCUSATE SODIUM 100 MG CAPSULE: Performed by: INTERNAL MEDICINE

## 2023-05-08 PROCEDURE — 85027 COMPLETE CBC AUTOMATED: CPT | Performed by: ORTHOPAEDIC SURGERY

## 2023-05-08 PROCEDURE — 97116 GAIT TRAINING THERAPY: CPT

## 2023-05-08 PROCEDURE — 97110 THERAPEUTIC EXERCISES: CPT

## 2023-05-08 RX ORDER — ASPIRIN 325 MG
325 TABLET, DELAYED RELEASE (ENTERIC COATED) ORAL DAILY
Qty: 30 TABLET | Refills: 0
Start: 2023-05-09 | End: 2023-06-08

## 2023-05-08 RX ORDER — DOCUSATE SODIUM 100 MG/1
100 CAPSULE, LIQUID FILLED ORAL 2 TIMES DAILY
Qty: 30 CAPSULE | Refills: 0
Start: 2023-05-08 | End: 2023-05-23

## 2023-05-08 RX ORDER — OXYCODONE HYDROCHLORIDE 5 MG/1
5 TABLET ORAL EVERY 4 HOURS PRN
Qty: 15 TABLET | Refills: 0 | Status: SHIPPED | OUTPATIENT
Start: 2023-05-08

## 2023-05-08 RX ORDER — ACETAMINOPHEN 500 MG
1000 TABLET ORAL EVERY 8 HOURS
Qty: 42 TABLET | Refills: 0
Start: 2023-05-08 | End: 2023-05-15

## 2023-05-08 RX ORDER — BISACODYL 10 MG
10 SUPPOSITORY, RECTAL RECTAL DAILY PRN
Start: 2023-05-08

## 2023-05-08 RX ORDER — POLYETHYLENE GLYCOL 3350 17 G/17G
17 POWDER, FOR SOLUTION ORAL DAILY
Start: 2023-05-09

## 2023-05-08 RX ADMIN — Medication 10 ML: at 08:56

## 2023-05-08 RX ADMIN — BISACODYL 5 MG: 5 TABLET, COATED ORAL at 14:35

## 2023-05-08 RX ADMIN — AMLODIPINE BESYLATE 5 MG: 5 TABLET ORAL at 08:53

## 2023-05-08 RX ADMIN — POLYETHYLENE GLYCOL 3350 17 G: 17 POWDER, FOR SOLUTION ORAL at 08:52

## 2023-05-08 RX ADMIN — MELOXICAM 15 MG: 15 TABLET ORAL at 08:53

## 2023-05-08 RX ADMIN — ASPIRIN 325 MG: 325 TABLET, COATED ORAL at 08:53

## 2023-05-08 RX ADMIN — DOCUSATE SODIUM 100 MG: 100 CAPSULE, LIQUID FILLED ORAL at 08:53

## 2023-05-08 NOTE — THERAPY TREATMENT NOTE
Patient Name: Janet Darden  : 1945    MRN: [id number]                              Today's Date: 2023       Admit Date: 2023    Visit Dx:     ICD-10-CM ICD-9-CM   1. Primary osteoarthritis of left hip  M16.12 715.15     Patient Active Problem List   Diagnosis   • Primary osteoarthritis of left hip   • HTN (hypertension)   • S/P total left hip arthroplasty     Past Medical History:   Diagnosis Date   • Arthritis    • Hypertension    • Osteoporosis      Past Surgical History:   Procedure Laterality Date   • CHOLECYSTECTOMY     • COLONOSCOPY     • HYSTERECTOMY     • MOUTH SURGERY      jaw surgery      General Information     Row Name 23 1011          Physical Therapy Time and Intention    Document Type therapy note (daily note)  -AB     Mode of Treatment physical therapy  -AB     Row Name 23 1011          General Information    Patient Profile Reviewed yes  -AB     Existing Precautions/Restrictions fall;left;hip, anterior  -AB     Barriers to Rehab previous functional deficit  -AB     Row Name 23 1011          Cognition    Orientation Status (Cognition) oriented x 4  -AB     Row Name 23 1011          Safety Issues, Functional Mobility    Safety Issues Affecting Function (Mobility) insight into deficits/self-awareness;awareness of need for assistance;safety precaution awareness;positioning of assistive device;safety precautions follow-through/compliance;sequencing abilities  -AB     Impairments Affecting Function (Mobility) balance;endurance/activity tolerance;pain;strength;range of motion (ROM)  -AB           User Key  (r) = Recorded By, (t) = Taken By, (c) = Cosigned By    Initials Name Provider Type    AB Minnie Mccormick PT Physical Therapist               Mobility     Row Name 23 1012          Bed Mobility    Comment, (Bed Mobility) Received and left [facility].  -AB     Row Name 23 1012          Transfers    Comment, (Transfers) Cues for hand placement,  sequencing, and walker management.  -AB     Row Name 05/08/23 1012          Sit-Stand Transfer    Sit-Stand [place] (Transfers) contact guard;1 person assist  -AB     Assistive Device (Sit-Stand Transfers) walker, front-wheeled  -AB     Comment, (Sit-Stand Transfer) Cues for advancing LLE prior to stand for pain mangagment.  -AB     Row Name 05/08/23 1012          Gait/Stairs (Locomotion)    [place] Level (Gait) minimum assist (75% patient effort);1 person assist;verbal cues;nonverbal cues (demo/gesture)  -AB     Assistive Device (Gait) walker, front-wheeled  -AB     Distance in Feet (Gait) 80  -AB     Deviations/Abnormal Patterns (Gait) gait speed decreased;stride length decreased;bilateral deviations;wan decreased  -AB     Bilateral Gait Deviations forward flexed posture;heel strike decreased  -AB     Left Sided Gait Deviations weight shift ability decreased  -AB     Comment, (Gait/Stairs) Pt demo's step to gait pattern with decreased weight acceptance onto LLE and forward flexed posture. Cues for increased weight bearing through LLE but pt continued to ambulated on forefoot. No LOB. Further gait limited by fatigue and weakness.  -AB     Row Name 05/08/23 1012          Mobility    Extremity Weight-bearing Status left lower extremity  -AB     Left Lower Extremity (Weight-bearing Status) weight-bearing as tolerated (WBAT)  -AB           User Key  (r) = Recorded By, (t) = Taken By, (c) = Cosigned By    Initials Name Provider Type    AB Minnie Mccormick, PT Physical Therapist               Obj/Interventions     Row Name 05/08/23 1016          Motor Skills    Therapeutic Exercise hip;knee;ankle  -AB     Row Name 05/08/23 1016          Hip (Therapeutic Exercise)    Hip Strengthening (Therapeutic Exercise) left;aBduction;aDduction;heel slides;10 repetitions  -AB     Row Name 05/08/23 1016          Knee (Therapeutic Exercise)    Knee (Therapeutic Exercise) isometric exercises;strengthening exercise  -AB      Knee Isometrics (Therapeutic Exercise) bilateral;quad sets;10 repetitions  -AB     Knee Strengthening (Therapeutic Exercise) left;SLR (straight leg raise);10 repetitions  -AB     Row Name 05/08/23 1016          Ankle (Therapeutic Exercise)    Ankle (Therapeutic Exercise) AROM (active range of motion)  -AB     Ankle AROM (Therapeutic Exercise) bilateral;dorsiflexion;plantarflexion;10 repetitions  -AB     Row Name 05/08/23 1016          Balance    Balance Assessment sitting static balance;sitting dynamic balance;standing static balance;standing dynamic balance  -AB     Static Sitting Balance supervision  -AB     Dynamic Sitting Balance supervision  -AB     Position, Sitting Balance unsupported;sitting in chair  -AB     Static Standing Balance contact guard;1-person assist  -AB     Dynamic Standing Balance minimal assist;1-person assist;verbal cues  -AB     Position/Device Used, Standing Balance supported;walker, front-wheeled  -AB     Balance Interventions sitting;standing;sit to stand;supported;static;dynamic  -AB     Comment, Balance no overt LOB.  -AB           User Key  (r) = Recorded By, (t) = Taken By, (c) = Cosigned By    Initials Name Provider Type    AB Minnie Mccormick, PT Physical Therapist               Goals/Plan    No documentation.                Clinical Impression     Row Name 05/08/23 1017          Pain    Pretreatment Pain Rating 3/10  -AB     Posttreatment Pain Rating 3/10  -AB     Pain Location - Side/Orientation Left  -AB     Pain Location generalized  -AB     Pain Location - hip  -AB     Pre/Posttreatment Pain Comment Increased pain with mobility.  -AB     Pain Intervention(s) Ambulation/increased activity;Elevated;Repositioned;Cold applied  -AB     Additional Documentation Pain Scale: Numbers Pre/Post-Treatment (Group)  -AB     Row Name 05/08/23 1017          Plan of Care Review    Plan of Care Reviewed With patient;spouse  -AB     Progress improving  -AB     Outcome Evaluation Pt increased  ambulatory distance to 80' with min Ax1 and FWW. Pt continues to be limited by weaknes, poor endurance, and increased pain causing functional mobility below baseline. Pt will benefit from skilled IPPT for addressing deficits. PT rec d/c to IRF.  -AB     Row Name 05/08/23 1017          Vital Signs    Pre Systolic BP Rehab 123  -AB     Pre Treatment Diastolic BP 62  -AB     Pretreatment Heart Rate (beats/min) 97  -AB     Posttreatment Heart Rate (beats/min) 95  -AB     Pre SpO2 (%) 94  -AB     O2 Delivery Pre Treatment nasal cannula  -AB     O2 Delivery Intra Treatment nasal cannula  -AB     Post SpO2 (%) 94  -AB     O2 Delivery Post Treatment nasal cannula  -AB     Pre Patient Position Sitting  -AB     Intra Patient Position Standing  -AB     Post Patient Position Sitting  -AB     Row Name 05/08/23 1017          Positioning and Restraints    Pre-Treatment Position sitting in chair/recliner  -AB     Post Treatment Position chair  -AB     In Chair sitting;reclined;notified nsg;call light within reach;encouraged to call for assist;exit alarm on;with family/caregiver;legs elevated;waffle cushion  -AB           User Key  (r) = Recorded By, (t) = Taken By, (c) = Cosigned By    Initials Name Provider Type    AB Minnie Mccormick, PT Physical Therapist               Outcome Measures     Row Name 05/08/23 1020          How much help from another person do you currently need...    Turning from your back to your side while in flat bed without using bedrails? 3  -AB     Moving from lying on back to sitting on the side of a flat bed without bedrails? 3  -AB     Moving to and from a bed to a chair (including a wheelchair)? 3  -AB     Standing up from a chair using your arms (e.g., wheelchair, bedside chair)? 3  -AB     Climbing 3-5 steps with a railing? 2  -AB     To walk in hospital room? 3  -AB     AM-PAC 6 Clicks Score (PT) 17  -AB     Highest level of mobility 5 --> Static standing  -AB     Row Name 05/08/23 1020           Functional Assessment    Outcome Measure Options AM-PAC 6 Clicks Basic Mobility (PT)  -AB           User Key  (r) = Recorded By, (t) = Taken By, (c) = Cosigned By    Initials Name Provider Type    Minnie Weems PT Physical Therapist                             Physical Therapy Education     Title: PT OT SLP Therapies (In Progress)     Topic: Physical Therapy (In Progress)     Point: Mobility training (In Progress)     Learning Progress Summary           Patient Acceptance, E,D, VU,NR by AB at 5/8/2023 1021    Acceptance, E,D, NR by CT at 5/7/2023 1601    Acceptance, E,D, NR by CT at 5/7/2023 1110    Acceptance, E,TB, VU by TB at 5/6/2023 2030    Acceptance, E, NR by AS at 5/6/2023 1439    Acceptance, E,D, VU,NR by AB at 5/5/2023 1644   Family Acceptance, E,D, NR by CT at 5/7/2023 1601    Acceptance, E,D, NR by CT at 5/7/2023 1110    Acceptance, E,D, VU,NR by AB at 5/5/2023 1644                   Point: Home exercise program (In Progress)     Learning Progress Summary           Patient Acceptance, E,D, VU,NR by AB at 5/8/2023 1021    Acceptance, E,D, NR by CT at 5/7/2023 1601    Acceptance, E,D, NR by CT at 5/7/2023 1110    Acceptance, E,TB, VU by TB at 5/6/2023 2030    Acceptance, E, NR by AS at 5/6/2023 1439    Acceptance, E,D, VU,NR by AB at 5/5/2023 1644   Family Acceptance, E,D, NR by CT at 5/7/2023 1601    Acceptance, E,D, NR by CT at 5/7/2023 1110    Acceptance, E,D, VU,NR by AB at 5/5/2023 1644                   Point: Body mechanics (In Progress)     Learning Progress Summary           Patient Acceptance, E,D, VU,NR by AB at 5/8/2023 1021    Acceptance, E,D, NR by CT at 5/7/2023 1601    Acceptance, E,D, NR by CT at 5/7/2023 1110    Acceptance, E,TB, VU by TB at 5/6/2023 2030    Acceptance, E, NR by AS at 5/6/2023 1439    Acceptance, E,D, VU,NR by AB at 5/5/2023 1644   Family Acceptance, E,D, NR by CT at 5/7/2023 1601    Acceptance, E,D, NR by CT at 5/7/2023 1110    Acceptance, E,D, VU,NR by AB at  5/5/2023 1644                   Point: Precautions (In Progress)     Learning Progress Summary           Patient Acceptance, E,D, VU,NR by AB at 5/8/2023 1021    Acceptance, E,D, NR by CT at 5/7/2023 1601    Acceptance, E,D, NR by CT at 5/7/2023 1110    Acceptance, E,TB, VU by TB at 5/6/2023 2030    Acceptance, E, NR by AS at 5/6/2023 1439    Acceptance, E,D, VU,NR by AB at 5/5/2023 1644   Family Acceptance, E,D, NR by CT at 5/7/2023 1601    Acceptance, E,D, NR by CT at 5/7/2023 1110    Acceptance, E,D, VU,NR by AB at 5/5/2023 1644                               User Key     Initials Effective Dates Name Provider Type Discipline    AS 04/28/23 -  Minnie Jimenez, PTA Physical Therapist Assistant PT    CT 02/03/23 -  Pierre Noland, PT Physical Therapist PT    TB 03/02/23 -  Samantha Vasquez, RN Registered Nurse Nurse    AB 09/22/22 -  Minnie Mccormick, PT Physical Therapist PT              PT Recommendation and Plan  Planned Therapy Interventions (PT): balance training, bed mobility training, gait training, home exercise program, postural re-education, patient/family education, ROM (range of motion), stair training, strengthening, stretching, transfer training  Plan of Care Reviewed With: patient, spouse  Progress: improving  Outcome Evaluation: Pt increased ambulatory distance to 80' with min Ax1 and FWW. Pt continues to be limited by weaknes, poor endurance, and increased pain causing functional mobility below baseline. Pt will benefit from skilled IPPT for addressing deficits. PT rec d/c to IRF.     Time Calculation:    PT Charges     Row Name 05/08/23 1021             Time Calculation    Start Time 0938  -AB      PT Received On 05/08/23  -AB      PT Goal Re-Cert Due Date 05/15/23  -AB         Timed Charges    53284 - PT Therapeutic Exercise Minutes 10  -AB      21285 - Gait Training Minutes  16  -AB         Total Minutes    Timed Charges Total Minutes 26  -AB       Total Minutes 26  -AB            User  Key  (r) = Recorded By, (t) = Taken By, (c) = Cosigned By    Initials Name Provider Type    AB Minnie Mccormick, PT Physical Therapist              Therapy Charges for Today     Code Description Service Date Service Provider Modifiers Qty    19528911735 HC PT THER PROC EA 15 MIN 5/8/2023 Minnie Mccormick, PT GP 1    [id number] HC GAIT TRAINING EA 15 MIN 5/8/2023 Minnie Mccormick, PT GP 1          PT G-Codes  Outcome Measure Options: AM-PAC 6 Clicks Basic Mobility (PT)  AM-PAC 6 Clicks Score (PT): 17  AM-PAC 6 Clicks Score (OT): 16  PT Discharge Summary  Anticipated Discharge Disposition (PT): inpatient rehabilitation facility    Minnie Mccormick PT  5/8/2023

## 2023-05-08 NOTE — THERAPY TREATMENT NOTE
Patient Name: Janet Darden  : 1945    MRN: [id number]                              Today's Date: 2023       Admit Date: 2023    Visit Dx:     ICD-10-CM ICD-9-CM   1. S/P total left hip arthroplasty  Z96.642 V43.64   2. Primary osteoarthritis of left hip  M16.12 715.15     Patient Active Problem List   Diagnosis   • Primary osteoarthritis of left hip   • HTN (hypertension)   • S/P total left hip arthroplasty     Past Medical History:   Diagnosis Date   • Arthritis    • Hypertension    • Osteoporosis      Past Surgical History:   Procedure Laterality Date   • CHOLECYSTECTOMY     • COLONOSCOPY     • HYSTERECTOMY     • MOUTH SURGERY      jaw surgery   • TOTAL HIP ARTHROPLASTY Left 2023    Procedure: MODIFIED ANTERIOR TOTAL HIP ARTHROPLASTY - LEFT;  Surgeon: Ezekiel Rodriguez MD;  Location: [facility];  Service: Orthopedics;  Laterality: Left;      General Information     Row Name 23 1333 23 1011       Physical Therapy Time and Intention    Document Type therapy note (daily note)  -AB therapy note (daily note)  -AB    Mode of Treatment physical therapy  -AB physical therapy  -AB    Row Name 23 1333 23 1011       General Information    Patient Profile Reviewed yes  -AB yes  -AB    Existing Precautions/Restrictions fall;left;hip, anterior  -AB fall;left;hip, anterior  -AB    Barriers to Rehab previous functional deficit  -AB previous functional deficit  -AB    Row Name 23 1333 23 1011       Cognition    Orientation Status (Cognition) oriented x 4  -AB oriented x 4  -AB    Row Name 23 1333 23 1011       Safety Issues, Functional Mobility    Safety Issues Affecting Function (Mobility) awareness of need for assistance;insight into deficits/self-awareness;safety precaution awareness;positioning of assistive device;safety precautions follow-through/compliance;sequencing abilities  -AB insight into deficits/self-awareness;awareness of need for  assistance;safety precaution awareness;positioning of assistive device;safety precautions follow-through/compliance;sequencing abilities  -AB    Impairments Affecting Function (Mobility) balance;endurance/activity tolerance;pain;strength;range of motion (ROM)  -AB balance;endurance/activity tolerance;pain;strength;range of motion (ROM)  -AB          User Key  (r) = Recorded By, (t) = Taken By, (c) = Cosigned By    Initials Name Provider Type    AB Minnie Mccormick PT Physical Therapist               Mobility     Row Name 05/08/23 1333 05/08/23 1012       Bed Mobility    Bed Mobility supine-sit  -AB --    Supine-Sit Ross (Bed Mobility) minimum assist (75% patient effort);1 person assist;verbal cues;nonverbal cues (demo/gesture)  -AB --    Assistive Device (Bed Mobility) draw sheet;bed rails  -AB --    Comment, (Bed Mobility) Used leg  for advancing LLE towards EOB. Boost at trunk.  -AB Received and left [facility].  -AB    Row Name 05/08/23 1333 05/08/23 1012       Transfers    Comment, (Transfers) Cues for hand placement, sequencing, and walker management.  -AB Cues for hand placement, sequencing, and walker management.  -AB    Row Name 05/08/23 1333          Bed-Chair Transfer    Bed-Chair Ross (Transfers) contact guard;1 person assist;verbal cues  -AB     Assistive Device (Bed-Chair Transfers) walker, front-wheeled  -AB     Row Name 05/08/23 1333 05/08/23 1012       Sit-Stand Transfer    Sit-Stand Ross (Transfers) contact guard;1 person assist;verbal cues  -AB contact guard;1 person assist  -AB    Assistive Device (Sit-Stand Transfers) walker, front-wheeled  -AB walker, front-wheeled  -AB    Comment, (Sit-Stand Transfer) STSx3 from multiple surface heights.  -AB Cues for advancing LLE prior to stand for pain mangagment.  -AB    Row Name 05/08/23 1333 05/08/23 1012       Gait/Stairs (Locomotion)    Ross Level (Gait) minimum assist (75% patient effort);1 person assist;verbal  cues;nonverbal cues (demo/gesture)  -AB minimum assist (75% patient effort);1 person assist;verbal cues;nonverbal cues (demo/gesture)  -AB    Assistive Device (Gait) walker, front-wheeled  -AB walker, front-wheeled  -AB    Distance in Feet (Gait) 60  -AB 80  -AB    Deviations/Abnormal Patterns (Gait) gait speed decreased;stride length decreased;bilateral deviations;wan decreased  -AB gait speed decreased;stride length decreased;bilateral deviations;wan decreased  -AB    Bilateral Gait Deviations forward flexed posture;heel strike decreased  -AB forward flexed posture;heel strike decreased  -AB    Left Sided Gait Deviations weight shift ability decreased  -AB weight shift ability decreased  -AB    Comment, (Gait/Stairs) Pt continues to demo decreased WB through LLE despite encouragement. Further gait limited by fatigue and pain.  -AB Pt demo's step to gait pattern with decreased weight acceptance onto LLE and forward flexed posture. Cues for increased weight bearing through LLE but pt continued to ambulated on forefoot. No LOB. Further gait limited by fatigue and weakness.  -AB    Row Name 05/08/23 1333 05/08/23 1012       Mobility    Extremity Weight-bearing Status left lower extremity  -AB left lower extremity  -AB    Left Lower Extremity (Weight-bearing Status) weight-bearing as tolerated (WBAT)  -AB weight-bearing as tolerated (WBAT)  -AB          User Key  (r) = Recorded By, (t) = Taken By, (c) = Cosigned By    Initials Name Provider Type    AB Minnie Mccormick, PT Physical Therapist               Obj/Interventions     Row Name 05/08/23 1335 05/08/23 1016       Motor Skills    Therapeutic Exercise hip;knee;ankle  -AB hip;knee;ankle  -AB    Row Name 05/08/23 1335 05/08/23 1016       Hip (Therapeutic Exercise)    Hip (Therapeutic Exercise) strengthening exercise;AAROM (active assistive range of motion)  -AB --    Hip AAROM (Therapeutic Exercise) flexion;left;10 repetitions  -AB --    Hip Strengthening  (Therapeutic Exercise) left;aBduction;heel slides;aDduction;10 repetitions  -AB left;aBduction;aDduction;heel slides;10 repetitions  -AB    Row Name 05/08/23 1335 05/08/23 1016       Knee (Therapeutic Exercise)    Knee (Therapeutic Exercise) isometric exercises  -AB isometric exercises;strengthening exercise  -AB    Knee Isometrics (Therapeutic Exercise) bilateral;quad sets;10 repetitions  -AB bilateral;quad sets;10 repetitions  -AB    Knee Strengthening (Therapeutic Exercise) left;LAQ (long arc quad);10 repetitions  -AB left;SLR (straight leg raise);10 repetitions  -AB    Row Name 05/08/23 1335 05/08/23 1016       Ankle (Therapeutic Exercise)    Ankle (Therapeutic Exercise) AROM (active range of motion)  -AB AROM (active range of motion)  -AB    Ankle AROM (Therapeutic Exercise) bilateral;dorsiflexion;plantarflexion;10 repetitions  -AB bilateral;dorsiflexion;plantarflexion;10 repetitions  -AB    Row Name 05/08/23 1335 05/08/23 1016       Balance    Balance Assessment sitting static balance;sitting dynamic balance;standing static balance;standing dynamic balance  -AB sitting static balance;sitting dynamic balance;standing static balance;standing dynamic balance  -AB    Static Sitting Balance supervision  -AB supervision  -AB    Dynamic Sitting Balance supervision  -AB supervision  -AB    Position, Sitting Balance unsupported;sitting edge of bed  -AB unsupported;sitting in chair  -AB    Static Standing Balance contact guard;1-person assist  -AB contact guard;1-person assist  -AB    Dynamic Standing Balance minimal assist;1-person assist;verbal cues  -AB minimal assist;1-person assist;verbal cues  -AB    Position/Device Used, Standing Balance supported;walker, front-wheeled  -AB supported;walker, front-wheeled  -AB    Balance Interventions sitting;standing;sit to stand;supported;static;dynamic  -AB sitting;standing;sit to stand;supported;static;dynamic  -AB    Comment, Balance No overt LOB  -AB no overt LOB.  -AB           User Key  (r) = Recorded By, (t) = Taken By, (c) = Cosigned By    Initials Name Provider Type    AB Minnie Mccormick, PT Physical Therapist               Goals/Plan    No documentation.                Clinical Impression     Row Name 05/08/23 1336 05/08/23 1017       Pain    Pretreatment Pain Rating 0/10 - no pain  -AB 3/10  -AB    Posttreatment Pain Rating 0/10 - no pain  -AB 3/10  -AB    Pain Location - Side/Orientation Left  -AB Left  -AB    Pain Location generalized  -AB generalized  -AB    Pain Location - hip  -AB hip  -AB    Pre/Posttreatment Pain Comment No pain at rest. Increased with mobility.  -AB Increased pain with mobility.  -AB    Pain Intervention(s) Ambulation/increased activity;Elevated;Repositioned  -AB Ambulation/increased activity;Elevated;Repositioned;Cold applied  -AB    Additional Documentation Pain Scale: Numbers Pre/Post-Treatment (Group)  -AB Pain Scale: Numbers Pre/Post-Treatment (Group)  -AB    Row Name 05/08/23 1336 05/08/23 1017       Plan of Care Review    Plan of Care Reviewed With patient;spouse  -AB patient;spouse  -AB    Progress no change  -AB improving  -AB    Outcome Evaluation Pt continues to be limited by generalized weakness, increased pain, and impaired endurance causing functional mobility below baseline. Pt ambulated 60' with min Ax1 and FWW. No overt LOB. Pt will benefit from further IPPT for addressing deficits. PT rec d/c to IRF.  -AB Pt increased ambulatory distance to 80' with min Ax1 and FWW. Pt continues to be limited by weaknes, poor endurance, and increased pain causing functional mobility below baseline. Pt will benefit from skilled IPPT for addressing deficits. PT rec d/c to IRF.  -AB    Row Name 05/08/23 1336 05/08/23 1017       Vital Signs    Pre Systolic BP Rehab 122  -  -AB    Pre Treatment Diastolic BP 66  -AB 62  -AB    Post Systolic BP Rehab 139  -AB --    Post Treatment Diastolic BP 63  -AB --    Pretreatment Heart Rate (beats/min) 94  -AB 97   -AB    Posttreatment Heart Rate (beats/min) 104  -AB 95  -AB    Pre SpO2 (%) 95  -AB 94  -AB    O2 Delivery Pre Treatment nasal cannula  -AB nasal cannula  -AB    O2 Delivery Intra Treatment nasal cannula  -AB nasal cannula  -AB    Post SpO2 (%) 94  -AB 94  -AB    O2 Delivery Post Treatment nasal cannula  -AB nasal cannula  -AB    Pre Patient Position Supine  -AB Sitting  -AB    Intra Patient Position Standing  -AB Standing  -AB    Post Patient Position Sitting  -AB Sitting  -AB    Row Name 05/08/23 1336 05/08/23 1017       Positioning and Restraints    Pre-Treatment Position in bed  -AB sitting in chair/recliner  -AB    Post Treatment Position chair  -AB chair  -AB    In Chair notified nsg;reclined;sitting;call light within reach;encouraged to call for assist;exit alarm on;with family/caregiver;legs elevated;waffle cushion  -AB sitting;reclined;notified nsg;call light within reach;encouraged to call for assist;exit alarm on;with family/caregiver;legs elevated;waffle cushion  -AB          User Key  (r) = Recorded By, (t) = Taken By, (c) = Cosigned By    Initials Name Provider Type    AB Minnie Mccormick, PT Physical Therapist               Outcome Measures     Row Name 05/08/23 1339 05/08/23 1020       How much help from another person do you currently need...    Turning from your back to your side while in flat bed without using bedrails? 3  -AB 3  -AB    Moving from lying on back to sitting on the side of a flat bed without bedrails? 3  -AB 3  -AB    Moving to and from a bed to a chair (including a wheelchair)? 3  -AB 3  -AB    Standing up from a chair using your arms (e.g., wheelchair, bedside chair)? 3  -AB 3  -AB    Climbing 3-5 steps with a railing? 2  -AB 2  -AB    To walk in hospital room? 3  -AB 3  -AB    AM-PAC 6 Clicks Score (PT) 17  -AB 17  -AB    Highest level of mobility 5 --> Static standing  -AB 5 --> Static standing  -AB    Row Name 05/08/23 0805          How much help from another person do you  currently need...    Turning from your back to your side while in flat bed without using bedrails? 3  -MK     Moving from lying on back to sitting on the side of a flat bed without bedrails? 3  -MK     Moving to and from a bed to a chair (including a wheelchair)? 3  -MK     Standing up from a chair using your arms (e.g., wheelchair, bedside chair)? 3  -MK     Climbing 3-5 steps with a railing? 2  -MK     To walk in hospital room? 3  -MK     AM-PAC 6 Clicks Score (PT) 17  -MK     Highest level of mobility 5 --> Static standing  -MK     Row Name 05/08/23 1020          Functional Assessment    Outcome Measure Options AM-PAC 6 Clicks Basic Mobility (PT)  -AB           User Key  (r) = Recorded By, (t) = Taken By, (c) = Cosigned By    Initials Name Provider Type    Nneka Espinoza, RN Registered Nurse    Minnie Weems, PT Physical Therapist                             Physical Therapy Education     Title: PT OT SLP Therapies (In Progress)     Topic: Physical Therapy (In Progress)     Point: Mobility training (In Progress)     Learning Progress Summary           Patient Acceptance, E,D, VU,NR by AB at 5/8/2023 1340    Acceptance, E,D, VU,NR by AB at 5/8/2023 1021    Acceptance, E,D, NR by CT at 5/7/2023 1601    Acceptance, E,D, NR by CT at 5/7/2023 1110    Acceptance, E,TB, VU by TB at 5/6/2023 2030    Acceptance, E, NR by AS at 5/6/2023 1439    Acceptance, E,D, VU,NR by AB at 5/5/2023 1644   Family Acceptance, E,D, NR by CT at 5/7/2023 1601    Acceptance, E,D, NR by CT at 5/7/2023 1110    Acceptance, E,D, VU,NR by AB at 5/5/2023 1644   Significant Other Acceptance, E,D, VU,NR by AB at 5/8/2023 1340                   Point: Home exercise program (In Progress)     Learning Progress Summary           Patient Acceptance, E,D, VU,NR by AB at 5/8/2023 1340    Acceptance, E,D, VU,NR by AB at 5/8/2023 1021    Acceptance, E,D, NR by CT at 5/7/2023 1601    Acceptance, E,D, NR by CT at 5/7/2023 1110    Acceptance, E,TB,  VU by TB at 5/6/2023 2030    Acceptance, E, NR by AS at 5/6/2023 1439    Acceptance, E,D, VU,NR by AB at 5/5/2023 1644   Family Acceptance, E,D, NR by CT at 5/7/2023 1601    Acceptance, E,D, NR by CT at 5/7/2023 1110    Acceptance, E,D, VU,NR by AB at 5/5/2023 1644   Significant Other Acceptance, E,D, VU,NR by AB at 5/8/2023 1340                   Point: Body mechanics (In Progress)     Learning Progress Summary           Patient Acceptance, E,D, VU,NR by AB at 5/8/2023 1340    Acceptance, E,D, VU,NR by AB at 5/8/2023 1021    Acceptance, E,D, NR by CT at 5/7/2023 1601    Acceptance, E,D, NR by CT at 5/7/2023 1110    Acceptance, E,TB, VU by TB at 5/6/2023 2030    Acceptance, E, NR by AS at 5/6/2023 1439    Acceptance, E,D, VU,NR by AB at 5/5/2023 1644   Family Acceptance, E,D, NR by CT at 5/7/2023 1601    Acceptance, E,D, NR by CT at 5/7/2023 1110    Acceptance, E,D, VU,NR by AB at 5/5/2023 1644   Significant Other Acceptance, E,D, VU,NR by AB at 5/8/2023 1340                   Point: Precautions (In Progress)     Learning Progress Summary           Patient Acceptance, E,D, VU,NR by AB at 5/8/2023 1340    Acceptance, E,D, VU,NR by AB at 5/8/2023 1021    Acceptance, E,D, NR by CT at 5/7/2023 1601    Acceptance, E,D, NR by CT at 5/7/2023 1110    Acceptance, E,TB, VU by TB at 5/6/2023 2030    Acceptance, E, NR by AS at 5/6/2023 1439    Acceptance, E,D, VU,NR by AB at 5/5/2023 1644   Family Acceptance, E,D, NR by CT at 5/7/2023 1601    Acceptance, E,D, NR by CT at 5/7/2023 1110    Acceptance, E,D, VU,NR by AB at 5/5/2023 1644   Significant Other Acceptance, E,D, VU,NR by AB at 5/8/2023 1340                               User Key     Initials Effective Dates Name Provider Type Discipline    AS 04/28/23 -  Minnie Jimenez, PTA Physical Therapist Assistant PT    CT 02/03/23 -  Pierre Noland, PT Physical Therapist PT    TB 03/02/23 -  Samantha Vasquez, RN Registered Nurse Nurse    AB 09/22/22 -  Minnie Mccormick  PT Physical Therapist PT              PT Recommendation and Plan  Planned Therapy Interventions (PT): balance training, bed mobility training, gait training, home exercise program, postural re-education, patient/family education, ROM (range of motion), stair training, strengthening, stretching, transfer training  Plan of Care Reviewed With: patient, spouse  Progress: no change  Outcome Evaluation: Pt continues to be limited by generalized weakness, increased pain, and impaired endurance causing functional mobility below baseline. Pt ambulated 60' with min Ax1 and FWW. No overt LOB. Pt will benefit from further IPPT for addressing deficits. PT rec d/c to IRF.     Time Calculation:    PT Charges     Row Name 05/08/23 1340 05/08/23 1021          Time Calculation    Start Time 1308  -AB 0938  -AB     PT Received On 05/08/23  -AB 05/08/23  -AB     PT Goal Re-Cert Due Date 05/15/23  -AB 05/15/23  -AB        Timed Charges    65588 - PT Therapeutic Exercise Minutes 10  -AB 10  -AB     32904 - Gait Training Minutes  15  -AB 16  -AB        Total Minutes    Timed Charges Total Minutes 25  -AB 26  -AB      Total Minutes 25  -AB 26  -AB           User Key  (r) = Recorded By, (t) = Taken By, (c) = Cosigned By    Initials Name Provider Type    AB Minnie Mccormick, PT Physical Therapist              Therapy Charges for Today     Code Description Service Date Service Provider Modifiers Qty    52696896824 HC PT THER PROC EA 15 MIN 5/8/2023 Minnie Mccormick, PT GP 1    19092725123 HC GAIT TRAINING EA 15 MIN 5/8/2023 Minnie Mccormick, PT GP 1    48517619341 HC PT THER PROC EA 15 MIN 5/8/2023 Minnie Mccormick, PT GP 1    95608658686 HC GAIT TRAINING EA 15 MIN 5/8/2023 Minnie Mccormick, PT GP 1          PT G-Codes  Outcome Measure Options: AM-PAC 6 Clicks Basic Mobility (PT)  AM-PAC 6 Clicks Score (PT): 17  AM-PAC 6 Clicks Score (OT): 16  PT Discharge Summary  Anticipated Discharge Disposition (PT): inpatient rehabilitation facility    Minnie  SUDHIR Mccormick, PT  5/8/2023

## 2023-05-08 NOTE — PLAN OF CARE
Goal Outcome Evaluation:  Plan of Care Reviewed With: patient, spouse        Progress: no change  Outcome Evaluation: Pt continues to be limited by generalized weakness, increased pain, and impaired endurance causing functional mobility below baseline. Pt ambulated 60' with min Ax1 and FWW. No overt LOB. Pt will benefit from further IPPT for addressing deficits. PT rec d/c to IRF.

## 2023-05-08 NOTE — DISCHARGE INSTRUCTIONS
[facility] COLD THERAPY - PATIENT INSTRUCTION SHEET    Cold Compression Therapy for your comfort and rehabilitation  Your caregivers want you to be productive in your rehab and comfortable during your stay. In keeping with those goals, you will be receiving an SMI Cold Therapy Wrap to help ease post-operative pain and swelling that might keep you from getting back on track! Your SMI Cold Therapy Wrap is effective and simple-to-use, and you will be encouraged to apply it throughout your hospital stay and at home through the duration of your recovery.    When you are ready to go home  Be sure to take your SMI Cold Therapy Wrap and both sets of Gel Bags with you for continued comfort and use throughout your rehabilitation. If you don't already have them, ask your nurse or aide to retrieve your SMI Gel Bags from the patient freezer.    Home use precautions  Always follow your medical professional's application instructions upon discharge. Your SMI Cold Therapy Wrap and Gel Bags are designed to last for months following your surgery. Never heat the Gel Bags unless specified by your healthcare provider. Supervision is advised when using this product on children or geriatric patients. To avoid danger of suffocation, please keep the outer plastic packaging away from children & pets.    Cold Therapy Instructions  Place Gel Bags in a freezer set ¾ of the way to max temperature for at least (4) hours. For best results, lay the Gel Bags flat and [id number] in the freezer. Once frozen, slide Gel Bags into the gel pouch and secure your wrap to the affected area with the straps.  Gel wraps that have been stored in a freezer for an extended period of time may require a (10) minute period of softening up in a room temperature environment before application.  The gel pouch acts as a protective barrier. NEVER place frozen bags directly onto skin, as this may cause frostbite injury.  The SMI Cold Therapy Wrap is designed to be able to be  worm while ambulating. The compression straps can be secured well enough so that the Wrap won't fall off while moving.  Wrap Application Videos can be viewed at [employer].  An additional protective barrier such as clothing, a washcloth, hand-towel or pillowcase may be used during prolonged treatment applications.  The Gel-Pouch and Wrap are both Latex-Free and the Gel Bag ingredients are non toxic.    SMI Wrap care instructions  The [facility] Cold Therapy Wrap may be hand washed and hung to dry when needed.    [facility] re-order information  Additional [facility] body specific wraps and/or Gel Bags can be re-ordered from [employer] or call [id number] ([phone])   EXOFIN CARING FOR YOUR WOUND    AFTER exofin Fusion SKIN CLOSURE SYSTEM HAS BEEN APPLIED    YOUR HEALTHCARE PROFESSIONAL HAS CHOSEN TO USE exofin Fusion SKIN CLOSURE SYSTEM TO CLOSE YOUR WOUND.    exocin Fusion is the combination of a mesh and liquid adhesive that allows the incision or wound to be held together during the healing process.    exocin Fusion should remain in place until your healthcare professional; has determined that adequate healing has occurred, which is usually anywhere between 7 to 14 days. In most cases, exofin Fusion is easily removed with little or no discomfort.    In the event that you notice that exofin Fusion is beginning to loosen or may be coming off, contact your healthcare professional.    The following information is provided to help you understand how to care for your incision and is based on the FDA-cleared product labeling.    You should always follow the instructions of your healthcare provider.    THINGS TO KNOW    BATHING OR SHOWERING    If directed by your healthcare professional, you may occasionally and briefly wet your incision or wound that was treated with exofin Fusion in the shower or bath. Do not soak or scrub your incision or wound. Do not swim or soak your incision or wound in water. After  showering or bathing, gently blot your incision or wound dry with a soft towel. If a dry protective dressing is being used over exofin Fusion, it should be replaced with a fresh, dry protective dressing after showering or bathing as directed by your healthcare practitioner. Care should also be taken so that any tape that may be part of the dry protective dressing does not come into contact with exofin Fusion because when the tape is removed, it may also remove exofin Fusion.    WOUND HEALING  If you experience any redness, swelling, discomfort, warmth or pus, contact your healthcare professional and he or she will determine how your incision or wound is healing and take the necessary steps to address any issues.    EXERCISE  Do not engage in strenuous exercise that may cause additional stress on your incision or wound. Follow your healthcare professional's guidance about when you can return to your normal activities.    OINTMENTS OR LIQUIDS  Topical ointments, liquids or any other products (other than dry bandages) should not be applied to the incision while exofin Fusion is in place. These may loosen exofin Fusion from the skin before it has completely healed.    REMOVING exofin Fusion  Your healthcare professional will determine when the healing process of your incision or wound has been completed and exofin Fusion is ready to be removed, which is usually between 7 to 14 days. When healing is complete, your healthcare professional will carefully peel off the exofin Fusion.    Prior to removal, do not scratch, rub or pick at the mesh. This may loosen the adhesive and mesh before the skin is healed.  In the event that you notice the exofin Fusion is beginning to loosen and may be coming off or comes off with the skin/wound, contract your healthcare professional.    For complete directions on the use of exofin Fusion, please refer to instructions for Use (IFU) included in the product packaging.  IF YOU HAVE ANY  QUESTIONS OR CONCERNS ABOUT exofin Fusion PLEASE CONTACT YOUR HEALTHCARE PROFESSIONAL.

## 2023-05-08 NOTE — PROGRESS NOTES
[facility] [place] Orthopaedic Surgery Progress Note    Subjective      LOS: 0 days   Patient Care Team:  Shaq Joya MD as PCP - General (Adolescent Medicine)    CC: Left hip pain    Interval History:   Patient resting comfortably in bed.  Complaining that the foot pumps kept her up last night.  Otherwise her hip is feeling better.    Objective      Vital Signs  Temp (24hrs), Av.3 °F (36.8 °C), Min:98.2 °F (36.8 °C), Max:98.5 °F (36.9 °C)      /64 (BP Location: Left arm, Patient Position: Lying)   Pulse 95   Temp 98.2 °F (36.8 °C) (Oral)   Resp 18   SpO2 97%     Examination:   Examination of the left hip: The wound is clean, dry, and intact.  Knee flexion, knee extension, ankle dorsiflexion, ankle plantar flexion, and EHL are intact.  Sensation intact in the foot to light touch.   Thigh is soft and nontender.      Labs:  Results from last 7 days   Lab Units 23  0511 23  0930 23  0521   WBC 10*3/mm3 10.42 12.71* 11.66*   HEMOGLOBIN g/dL 9.1* 9.9* 10.2*   HEMATOCRIT % 27.8* 30.3* 31.0*   MCV fL 93.9 94.1 93.9   PLATELETS 10*3/mm3 233 248 250       Radiology:  Imaging Results (Last 24 Hours)     ** No results found for the last 24 hours. **          PT:  Physical Therapy - Plan of Care Review - Outcome Summary:  Outcome Evaluation: 70 ft this pm sequence still incorrect but safe with reluctance to wb (23 [id number])]       Results Review:     I reviewed the patient's new clinical results.    Assessment and Plan     Assessment:   Status post left total hip arthroplasty-doing well        S/P total left hip arthroplasty    Primary osteoarthritis of left hip    HTN (hypertension)      Plan for disposition: Plan for discharge most likely to skilled nursing facility at the time of discharge.  Case management to follow-up today with options.  Follow-up with me in 3 weeks in the office.      Future Appointments   Date Time Provider Department Center   2023  1:30 PM Ezekiel Rodriguez MD [facility]           Ezekiel Rodriguez MD  05/08/23  07:31 EDT

## 2023-05-08 NOTE — CASE MANAGEMENT/SOCIAL WORK
Case Management Discharge Note      Final Note: Per Nuria at [facility] ( [place]), they have received insurance approval for transfer to a skilled bed today. She and  are in agreement with plan. [employer] wheelchair van scheduled to transport at 1530, call report to [phone]         Selected Continued Care - Admitted Since 5/5/2023     Destination Coordination complete.    Service Provider Selected Services Address Phone Fax Patient Preferred    [facility] [phone], [place] [address] [phone] --          Durable Medical Equipment    No services have been selected for the patient.              Dialysis/Infusion    No services have been selected for the patient.              Home Medical Care    No services have been selected for the patient.              Therapy    No services have been selected for the patient.              Community Resources    No services have been selected for the patient.              Community & DME    No services have been selected for the patient.                  Transportation Services  W/C Van:  ([employer])    Final Discharge Disposition Code: 03 - skilled nursing facility (SNF)

## 2023-05-08 NOTE — PLAN OF CARE
Goal Outcome Evaluation:  Plan of Care Reviewed With: patient        Progress: improving  Outcome Evaluation: Discharged to rehab facility report given to Aixa GARCIA. Pt given discharge instructions and follow up appointment information. Verbalizes understanding. IV discontinued. Dressing to site. No bleeding noted. Covid swab negative.

## 2023-05-08 NOTE — DISCHARGE SUMMARY
Patient Name: Janet Darden  MRN: [id number]  : 1945  DOS: 2023    Attending: Ezekiel Rodriguez MD    Primary Care Provider: Shaq Joya MD    Date of Admission:.2023  8:01 AM    Date of Discharge:  2023    Discharge Diagnosis:   S/P total left hip arthroplasty    Primary osteoarthritis of left hip    HTN (hypertension)    Postoperative pain      Hospital Course  At admit:  Patient is a pleasant 77 y.o. female presented for scheduled surgery by Dr. Rodriguez.     Patient has had left hip pain, this has been present for several years, has been quite severe and has not responded to conservative management.     I saw her preoperatively she is doing fairly well, we reviewed her home medications and past medical history.     Subsequent notes indicate she underwent left total hip arthroplasty, anterior modified by Dr. Rodriguez.  Surgery was done under spinal anesthesia, was tolerated well, she was admitted for further management.     After admit  Patient was provided pain medications as needed for pain control.    Adjustments were made to pain medications to optimize postop pain management when indicated. Risks and benefits of opiate medications discussed with patient. GILMERYfnISRAEL report was reviewed.    She was seen by PT and OT and has progressed well over her stay. IPR was recommended.    She used an IS for atelectasis prophylaxis and ASA along with mechanicals for DVT prophylaxis.    Home medications were resumed as appropriate, and labs were monitored and remained fairly stable.     With the progress she has made,  is ready for DC to IPR  today.      Discussed with patient regarding plan and she shows understanding and agreement.      She needs to finish a month of  mg daily from surgery, needs prescription at time of dc from rehab.        Procedures Performed  Procedure(s):  MODIFIED ANTERIOR TOTAL HIP ARTHROPLASTY - LEFT       Pertinent Test Results:    I reviewed the patient's new  clinical results.   Results from last 7 days   Lab Units 23  0511 23  0930 23  0521   WBC 10*3/mm3 10.42 12.71* 11.66*   HEMOGLOBIN g/dL 9.1* 9.9* 10.2*   HEMATOCRIT % 27.8* 30.3* 31.0*   PLATELETS 10*3/mm3 233 248 250     Results from last 7 days   Lab Units 23  0521   SODIUM mmol/L 136   POTASSIUM mmol/L 4.7   CHLORIDE mmol/L 102   CO2 mmol/L 28.0   BUN mg/dL 12   CREATININE mg/dL 0.88   CALCIUM mg/dL 9.2   GLUCOSE mg/dL 112*     I reviewed the patient's new imaging including images and reports.          Physical therapy  Plan of Care Reviewed With: patient, spouse  Progress: no change  Outcome Evaluation: Pt continues to be limited by generalized weakness, increased pain, and impaired endurance causing functional mobility below baseline. Pt ambulated 60' with min Ax1 and FWW. No overt LOB. Pt will benefit from further IPPT for addressing deficits. PT rec d/c to IRF.    Discharge Assessment:      Visit Vitals  /66 (BP Location: Left arm, Patient Position: Lying)   Pulse 88   Temp 97.9 °F (36.6 °C) (Oral)   Resp 18   SpO2 97%     Temp (24hrs), Av.2 °F (36.8 °C), Min:97.9 °F (36.6 °C), Max:98.5 °F (36.9 °C)      General Appearance:    Alert, cooperative, in no acute distress   Lungs:     Clear to auscultation,respirations regular, even and                   unlabored    Heart:    Regular rhythm and normal rate, normal S1 and S2   Abdomen:     Normal bowel sounds, no masses, no organomegaly, soft        non-tender, non-distended, no guarding, no rebound                 tenderness   Extremities:   CDI dressing over hip incision.   Pulses:   Pulses palpable and equal bilaterally   Skin:   No bleeding, bruising or rash   Neurologic:   Cranial nerves 2 - 12 grossly intact, sensation intact, Flexion and dorsiflexion intact bilateral feet.         Discharge Disposition:SNF.         Discharge Medications      New Medications      Instructions Start Date   aspirin  MG tablet   325 mg,  Oral, Daily   Start Date: May 9, 2023     bisacodyl 10 MG suppository  Commonly known as: Dulcolax   10 mg, Rectal, Daily PRN      docusate sodium 100 MG capsule  Commonly known as: COLACE   100 mg, Oral, 2 Times Daily      oxyCODONE 5 MG immediate release tablet  Commonly known as: Roxicodone   5 mg, Oral, Every 4 Hours PRN      polyethylene glycol 17 g packet  Commonly known as: MIRALAX   17 g, Oral, Daily   Start Date: May 9, 2023        Changes to Medications      Instructions Start Date   acetaminophen 500 MG tablet  Commonly known as: TYLENOL  What changed:   · how much to take  · when to take this  · reasons to take this  · additional instructions   1,000 mg, Oral, Every 8 Hours, Take every 8 hours  as needed after 1 week         Continue These Medications      Instructions Start Date   amLODIPine 5 MG tablet  Commonly known as: NORVASC   1 tablet, Oral, Daily      CALCIUM 600+D PO   Oral, Daily, Patient been holding since 4/12/23.      celecoxib 50 MG capsule  Commonly known as: CeleBREX   50 mg, Oral, 2 Times Daily      cetirizine 10 MG tablet  Commonly known as: zyrTEC   10 mg, Oral, Daily      melatonin 5 MG tablet tablet   2.5 mg, Oral, Nightly      triamterene-hydrochlorothiazide 37.5-25 MG per capsule  Commonly known as: DYAZIDE   1 capsule, Oral, Daily      VITAMIN C PO   1,000 mg, Oral, Patient been holding since 4/12/23      Zinc 50 MG capsule   Oral, Patient been holding since 4/12/23         Stop These Medications    Antiseptic Skin Cleanser 4 % solution  Generic drug: Chlorhexidine Gluconate     traMADol 50 MG tablet  Commonly known as: ULTRAM            Discharge Diet: Regular.    Activity at Discharge:  Weight bearing as tolerated  Total hip precautions.    Follow-up Appointments:  Future Appointments   Date Time Provider Department Center   5/31/2023  1:30 PM Ezekiel Rodriguez MD [facility]        Dragon disclaimer:  Part of this encounter note is an electronic transcription/translation of  spoken language to printed text. The electronic translation of spoken language may permit erroneous, or at times, nonsensical words or phrases to be inadvertently transcribed; Although I have reviewed the note for such errors, some may still exist.       Roseanne Valencia MD  05/08/23  14:12 EDT

## 2023-05-08 NOTE — PLAN OF CARE
Goal Outcome Evaluation:  Plan of Care Reviewed With: patient        Progress: no change   Pt is alert and oriented x4. Vss on room air to 2L NC at night. Pt has slept off and on throughout the shift. PRN Roxicodone given as ordered for pain. Ice packs utilized. Prineo dressing to left hip is CDI. Up with an assist x1, gait belt, and walker to the BSC; voiding spontaneously. Anticipating d/c to rehab when medically ready. Will continue to monitor.

## 2023-05-08 NOTE — PLAN OF CARE
Goal Outcome Evaluation:  Plan of Care Reviewed With: patient, spouse        Progress: improving  Outcome Evaluation: Pt increased ambulatory distance to 80' with min Ax1 and FWW. Pt continues to be limited by weaknes, poor endurance, and increased pain causing functional mobility below baseline. Pt will benefit from skilled IPPT for addressing deficits. PT rec d/c to IRF.

## 2023-05-09 ENCOUNTER — TELEPHONE (OUTPATIENT)
Dept: ORTHOPEDIC SURGERY | Facility: CLINIC | Age: 78
End: 2023-05-09
Payer: MEDICARE

## 2023-05-09 NOTE — TELEPHONE ENCOUNTER
Moncho from [facility] was calling about this mutual patient that is in her care.  She was calling about the dressing on her hip from surgery and she was needing to know if they dressing can be removed.  I attempted to look in the chart for discharge instructions but could not find any so if someone can find out and call Moncho back to let her know.  [phone] just ask for Moncho when they answer.

## 2023-05-09 NOTE — TELEPHONE ENCOUNTER
Spoke with Moncho to let her know Dr. Rodriguez's post-op hip dressings can be removed after day 2 post-op; Explained that the gauze can be removed but the mesh dressing should remain in place until the pt follows up with Dr. Rodriguez; Can shower, but no soaking. She verbalized understanding.    Allyson KING CMA ([facility]), ROT

## 2023-05-31 ENCOUNTER — OFFICE VISIT (OUTPATIENT)
Dept: ORTHOPEDIC SURGERY | Facility: CLINIC | Age: 78
End: 2023-05-31

## 2023-05-31 VITALS — TEMPERATURE: 96.2 F

## 2023-05-31 DIAGNOSIS — Z96.642 STATUS POST TOTAL HIP REPLACEMENT, LEFT: Primary | ICD-10-CM

## 2023-05-31 DIAGNOSIS — Z47.89 ORTHOPEDIC AFTERCARE: ICD-10-CM

## 2023-05-31 PROCEDURE — 99024 POSTOP FOLLOW-UP VISIT: CPT | Performed by: ORTHOPAEDIC SURGERY

## 2023-05-31 PROCEDURE — 1160F RVW MEDS BY RX/DR IN RCRD: CPT | Performed by: ORTHOPAEDIC SURGERY

## 2023-05-31 PROCEDURE — 1159F MED LIST DOCD IN RCRD: CPT | Performed by: ORTHOPAEDIC SURGERY

## 2023-05-31 RX ORDER — TRAZODONE HYDROCHLORIDE 50 MG/1
50 TABLET ORAL
COMMUNITY
Start: 2023-05-14

## 2023-05-31 RX ORDER — ANORECTAL OINTMENT 15.7; .44; 24; 20.6 G/100G; G/100G; G/100G; G/100G
OINTMENT TOPICAL
COMMUNITY
Start: 2023-05-23

## 2023-05-31 NOTE — PROGRESS NOTES
[facility] Orthopaedic Surgery Clinic Note    Subjective     Chief Complaint   Patient presents with   • Post-op     3 weeks status post Modified Anterior Total Hip Arthroplasty - Left 5/5/2023        HPI    It has been 3  week(s) since Ms. Darden's last visit. She returns to clinic today for postoperative follow-up of left hip arthroplasty. The issue has been ongoing for 3 week(s). She rates her pain a 0/10 on the pain scale. Previous/current treatments: cane/walker and physical therapy. Overall, she is doing better.  100% improvement compared to her preoperative symptoms.  Ambulating with the aid of a walker today.  Very pleased with the results.    I have reviewed the following portions of the patient's history and agree with: History of Present Illness and Review of Systems    Patient Active Problem List   Diagnosis   • Primary osteoarthritis of left hip   • HTN (hypertension)   • S/P total left hip arthroplasty   • Postoperative pain     Past Medical History:   Diagnosis Date   • Arthritis    • Arthritis of back    • Hip arthrosis    • Hypertension    • Osteoporosis       Past Surgical History:   Procedure Laterality Date   • CHOLECYSTECTOMY  1970   • COLONOSCOPY     • HYSTERECTOMY  1994   • MOUTH SURGERY  1987    jaw surgery   • TOTAL HIP ARTHROPLASTY Left 5/5/2023    Procedure: MODIFIED ANTERIOR TOTAL HIP ARTHROPLASTY - LEFT;  Surgeon: Ezekiel Rodriguez MD;  Location: [facility];  Service: Orthopedics;  Laterality: Left;      Family History   Problem Relation Age of Onset   • Cancer Mother    • Diabetes Mother    • Cancer Father      Social History     Socioeconomic History   • Marital status:    Tobacco Use   • Smoking status: Never   • Smokeless tobacco: Never   Vaping Use   • Vaping Use: Never used   Substance and Sexual Activity   • Alcohol use: Never   • Drug use: Never   • Sexual activity: Defer      Current Outpatient Medications on File Prior to Visit   Medication Sig Dispense Refill   • amLODIPine  (NORVASC) 5 MG tablet Take 1 tablet by mouth Daily.     • Ascorbic Acid (VITAMIN C PO) Take 1,000 mg by mouth. Patient been holding since 4/12/23     • aspirin EC (aspirin) 325 MG tablet Take 1 tablet by mouth Daily for 30 days. 30 tablet 0   • bisacodyl (Dulcolax) 10 MG suppository Insert 1 suppository into the rectum Daily As Needed for Constipation.     • Calcium Carbonate-Vitamin D (CALCIUM 600+D PO) Take  by mouth Daily. Patient been holding since 4/12/23.     • Calmoseptine 0.44-20.6 % ointment APPLY OINTMENT EXTERNALLY TO AFFECTED AREA TWICE DAILY FOR 14 DAYS     • cetirizine (zyrTEC) 10 MG tablet Take 1 tablet by mouth As Needed.     • polyethylene glycol (MIRALAX) 17 g packet Take 17 g by mouth Daily.     • traZODone (DESYREL) 50 MG tablet Take 1 tablet by mouth every night at bedtime.     • triamterene-hydrochlorothiazide (DYAZIDE) 37.5-25 MG per capsule Take 1 capsule by mouth Daily.     • Zinc 50 MG capsule Take  by mouth. Patient been holding since 4/12/23     • [DISCONTINUED] celecoxib (CeleBREX) 50 MG capsule Take 1 capsule by mouth 2 (Two) Times a Day.     • [DISCONTINUED] melatonin 5 MG tablet tablet Take 2.5 mg by mouth Every Night.     • [DISCONTINUED] oxyCODONE (Roxicodone) 5 MG immediate release tablet Take 1 tablet by mouth Every 4 (Four) Hours As Needed for Moderate Pain. 15 tablet 0     No current facility-administered medications on file prior to visit.      Allergies   Allergen Reactions   • Prednisone Other (See Comments)     Causes insomnia        Review of Systems   Constitutional: Negative for activity change, appetite change, chills, diaphoresis, fatigue, fever and unexpected weight change.   HENT: Negative for congestion, dental problem, drooling, ear discharge, ear pain, facial swelling, hearing loss, mouth sores, nosebleeds, postnasal drip, rhinorrhea, sinus pressure, sneezing, sore throat, tinnitus, trouble swallowing and voice change.    Eyes: Negative for photophobia, pain,  discharge, redness, itching and visual disturbance.   Respiratory: Negative for apnea, cough, choking, chest tightness, shortness of breath, wheezing and stridor.    Cardiovascular: Negative for chest pain, palpitations and leg swelling.   Gastrointestinal: Negative for abdominal distention, abdominal pain, anal bleeding, blood in stool, constipation, diarrhea, nausea, rectal pain and vomiting.   Endocrine: Negative for cold intolerance, heat intolerance, polydipsia, polyphagia and polyuria.   Genitourinary: Negative for decreased urine volume, difficulty urinating, dysuria, enuresis, flank pain, frequency, genital sores, hematuria and urgency.   Musculoskeletal: Positive for arthralgias. Negative for back pain, gait problem, joint swelling, myalgias, neck pain and neck stiffness.   Skin: Negative for color change, pallor, rash and wound.   Allergic/Immunologic: Negative for environmental allergies, food allergies and immunocompromised state.   Neurological: Negative for dizziness, tremors, seizures, syncope, facial asymmetry, speech difficulty, weakness, light-headedness, numbness and headaches.   Hematological: Negative for adenopathy. Does not bruise/bleed easily.   Psychiatric/Behavioral: Negative for agitation, behavioral problems, confusion, decreased concentration, dysphoric mood, hallucinations, self-injury, sleep disturbance and suicidal ideas. The patient is not nervous/anxious and is not hyperactive.         Objective      Physical Exam  Temp 96.2 °F (35.7 °C)     There is no height or weight on file to calculate BMI.    General:   Mental Status:  Alert   Appearance: Cooperative, in no acute distress   Build and Nutrition: Well-nourished well-developed female   Orientation: Alert and oriented to person, place and time   Posture: Normal   Gait: With a walker    Integument:   Left hip: Wound is healing well with no signs of infection    Lower Extremity:   Left Hip:    Tenderness:  None    Swelling:   None    Crepitus:  None    Range of motion:  External Rotation: 30°       Internal Rotation: 30°       Flexion:  100°       Extension:  0°    Deformities:  None  Functional testing: Negative Stinchfield    No leg length discrepancy      Imaging/Studies  Imaging Results (Last 24 Hours)     Procedure Component Value Units Date/Time    XR Hip With or Without Pelvis 2 - 3 View Left [[id number]] Resulted: 05/31/23 1343     Updated: 05/31/23 1344    Narrative:      Left Hip Radiographs  Indication: status-post left total hip arthroplasty  Views: low AP pelvis and lateral of the left hip    Comparison: no change compared to prior study, 5/5/2023    Findings:   The components are well aligned, with no signs of loosening or failure.              Assessment and Plan     Diagnoses and all orders for this visit:    1. Status post total hip replacement, left (Primary)  -     XR Hip With or Without Pelvis 2 - 3 View Left    2. Orthopedic aftercare        1. Status post total hip replacement, left    2. Orthopedic aftercare        I reviewed my findings with the patient.  Her left total hip arthroplasty is functioning well, and she is pleased with results.  I will see her back in 6 weeks, no x-rays are required.  I will see her back sooner for any problems.    Return in about 6 weeks (around 7/12/2023).      Ezekiel Rodriguez MD  05/31/23  13:52 EDT

## 2024-05-22 ENCOUNTER — OFFICE VISIT (OUTPATIENT)
Dept: ORTHOPEDIC SURGERY | Facility: CLINIC | Age: 79
End: 2024-05-22
Payer: MEDICARE

## 2024-05-22 VITALS
SYSTOLIC BLOOD PRESSURE: 120 MMHG | BODY MASS INDEX: 27.6 KG/M2 | WEIGHT: 146.2 LBS | HEIGHT: 61 IN | DIASTOLIC BLOOD PRESSURE: 80 MMHG

## 2024-05-22 DIAGNOSIS — Z09 POSTOPERATIVE EXAMINATION: Primary | ICD-10-CM

## 2024-05-22 DIAGNOSIS — Z96.642 STATUS POST TOTAL HIP REPLACEMENT, LEFT: ICD-10-CM

## 2024-05-22 NOTE — PROGRESS NOTES
[facility] Note    Subjective     Chief Complaint   Patient presents with    Follow-up     10 month follow up -- 1 year status post left modified anterior total hip arthroplasty (DOS 5/5/23)           HPI    It has been 10  month(s) since Ms. Darden's last visit. She returns to clinic today for follow-up of left hip arthroplasty. The issue has been ongoing for 1 year(s). She rates her pain a 1/10 on the pain scale. Previous/current treatments: physical therapy. Current symptoms:  none . The pain is worse with climbing stairs; resting improve the pain. Overall, she is doing better.  110% improvement compared to preoperative symptoms.  Fully ambulatory without external aids.    I have reviewed the following portions of the patient's history and agree with: History of Present Illness and Review of Systems    Patient Active Problem List   Diagnosis    Primary osteoarthritis of left hip    HTN (hypertension)    S/P total left hip arthroplasty    Postoperative pain     Past Medical History:   Diagnosis Date    Arthritis     Arthritis of back     Hip arthrosis     Hypertension     Osteoporosis       Past Surgical History:   Procedure Laterality Date    CHOLECYSTECTOMY  1970    COLONOSCOPY      HYSTERECTOMY  1994    MOUTH SURGERY  1987    jaw surgery    TOTAL HIP ARTHROPLASTY Left 5/5/2023    Procedure: MODIFIED ANTERIOR TOTAL HIP ARTHROPLASTY - LEFT;  Surgeon: Ezekiel Rodriguez MD;  [facility]: [facility];  Service: Orthopedics;  Laterality: Left;      Family History   Problem Relation Age of Onset    Cancer Mother     Diabetes Mother     Cancer Father      Social History     Socioeconomic History    Marital status:    Tobacco Use    Smoking status: Never    Smokeless tobacco: Never   Vaping Use    Vaping status: Never Used   Substance and Sexual Activity    Alcohol use: Never    Drug use: Never    Sexual activity: Defer      Current Outpatient Medications on File Prior to Visit   Medication Sig  Dispense Refill    amLODIPine (NORVASC) 5 MG tablet Take 1 tablet by mouth Daily.      Ascorbic Acid (VITAMIN C PO) Take 1,000 mg by mouth. Patient been holding since 4/12/23      cetirizine (zyrTEC) 10 MG tablet Take 1 tablet by mouth As Needed.      traZODone (DESYREL) 50 MG tablet Take 1 tablet by mouth every night at bedtime.      triamterene-hydrochlorothiazide (DYAZIDE) 37.5-25 MG per capsule Take 1 capsule by mouth Daily.      [DISCONTINUED] bisacodyl (Dulcolax) 10 MG suppository Insert 1 suppository into the rectum Daily As Needed for Constipation.      [DISCONTINUED] Calcium Carbonate-Vitamin D (CALCIUM 600+D PO) Take  by mouth Daily. Patient been holding since 4/12/23. (Patient not taking: Reported on 5/22/2024)      [DISCONTINUED] Calmoseptine 0.44-20.6 % ointment APPLY OINTMENT EXTERNALLY TO AFFECTED AREA TWICE DAILY FOR 14 DAYS      [DISCONTINUED] polyethylene glycol (MIRALAX) 17 g packet Take 17 g by mouth Daily.      [DISCONTINUED] Zinc 50 MG capsule Take  by mouth. Patient been holding since 4/12/23 (Patient not taking: Reported on 5/22/2024)       No current facility-administered medications on file prior to visit.      Allergies   Allergen Reactions    Prednisone Other (See Comments)     Causes insomnia        Review of Systems   Constitutional:  Negative for activity change, appetite change, chills, diaphoresis, fatigue, fever and unexpected weight change.   HENT:  Negative for congestion, dental problem, drooling, ear discharge, ear pain, facial swelling, hearing loss, mouth sores, nosebleeds, postnasal drip, rhinorrhea, sinus pressure, sneezing, sore throat, tinnitus, trouble swallowing and voice change.    Eyes:  Negative for photophobia, pain, discharge, redness, itching and visual disturbance.   Respiratory:  Negative for apnea, cough, choking, chest tightness, shortness of breath, wheezing and stridor.    Cardiovascular:  Negative for chest pain, palpitations and leg swelling.  "  Gastrointestinal:  Negative for abdominal distention, abdominal pain, anal bleeding, blood in stool, constipation, diarrhea, nausea, rectal pain and vomiting.   Endocrine: Negative for cold intolerance, heat intolerance, polydipsia, polyphagia and polyuria.   Genitourinary:  Negative for decreased urine volume, difficulty urinating, dysuria, enuresis, flank pain, frequency, genital sores, hematuria and urgency.   Musculoskeletal:  Positive for arthralgias. Negative for back pain, gait problem, joint swelling, myalgias, neck pain and neck stiffness.   Skin:  Negative for color change, pallor, rash and wound.   Allergic/Immunologic: Negative for environmental allergies, food allergies and immunocompromised state.   Neurological:  Negative for dizziness, tremors, seizures, syncope, facial asymmetry, speech difficulty, weakness, light-headedness, numbness and headaches.   Hematological:  Negative for adenopathy. Does not bruise/bleed easily.   Psychiatric/Behavioral:  Negative for agitation, behavioral problems, confusion, decreased concentration, dysphoric mood, hallucinations, self-injury, sleep disturbance and suicidal ideas. The patient is not nervous/anxious and is not hyperactive.         Objective      Physical Exam  /80   Ht 154.9 cm (61\")   Wt 66.3 kg (146 lb 3.2 oz)   BMI 27.62 kg/m²     Body mass index is 27.62 kg/m².  BMI is >= 25 and <30. (Overweight) The following options were offered after discussion;: none (medical contraindication)      General:   Mental Status:  Alert   Appearance: Cooperative, in no acute distress   Build and Nutrition: Well-nourished well-developed female   Orientation: Alert and oriented to person, place and time   Posture: Normal   Gait: Nonantalgic    Integument:              Left hip: Wound is well-healed with no signs of infection     Lower Extremity:              Left Hip:                          Tenderness:    None                          Swelling:          None   "                        Crepitus:          None                          Range of motion:        External Rotation:       30°                                                              Internal Rotation:        30°                                                              Flexion:                       100°                                                              Extension:                   0°                       Deformities:     None  Functional testing: Negative Stinchfield                          No leg length discrepancy    Imaging/Studies  Imaging Results (Last 24 Hours)       Procedure Component Value Units Date/Time    XR Hip With or Without Pelvis 2 - 3 View Left [[id number]] Resulted: 05/22/24 1136     Updated: 05/22/24 1137    Narrative:      Left Hip Radiographs  Indication: status-post left total hip arthroplasty  Views: low AP pelvis and lateral of the left hip    Comparison: no change compared to prior study, 5/31/2023    Findings:   The components are well aligned, with no signs of loosening or failure.                 Assessment and Plan     Diagnoses and all orders for this visit:    1. Postoperative examination (Primary)    2. Status post total hip replacement, left  -     XR Hip With or Without Pelvis 2 - 3 View Left        1. Postoperative examination    2. Status post total hip replacement, left        I reviewed my findings with the patient.  Her left total hip arthroplasty is functioning well and she is pleased with results.  I will see her back in 4 years, which will be a 5-year checkup with x-rays.  I will see her back sooner for any problems.    Return in about 4 years (around 5/22/2028) for recheck with x-rays.      Ezekiel Rodriguez MD  05/22/24  11:41 EDT    Dictated Utilizing Dragon Dictation

## 2024-10-14 ENCOUNTER — OFFICE VISIT (OUTPATIENT)
Dept: ORTHOPEDIC SURGERY | Facility: CLINIC | Age: 79
End: 2024-10-14
Payer: MEDICARE

## 2024-10-14 VITALS
BODY MASS INDEX: 28.7 KG/M2 | WEIGHT: 152 LBS | SYSTOLIC BLOOD PRESSURE: 160 MMHG | HEIGHT: 61 IN | DIASTOLIC BLOOD PRESSURE: 90 MMHG

## 2024-10-14 DIAGNOSIS — M25.552 LEFT HIP PAIN: Primary | ICD-10-CM

## 2024-10-14 DIAGNOSIS — Z96.642 STATUS POST TOTAL HIP REPLACEMENT, LEFT: ICD-10-CM

## 2024-10-14 PROCEDURE — 1159F MED LIST DOCD IN RCRD: CPT | Performed by: ORTHOPAEDIC SURGERY

## 2024-10-14 PROCEDURE — 3080F DIAST BP >= 90 MM HG: CPT | Performed by: ORTHOPAEDIC SURGERY

## 2024-10-14 PROCEDURE — 99213 OFFICE O/P EST LOW 20 MIN: CPT | Performed by: ORTHOPAEDIC SURGERY

## 2024-10-14 PROCEDURE — 3077F SYST BP >= 140 MM HG: CPT | Performed by: ORTHOPAEDIC SURGERY

## 2024-10-14 PROCEDURE — 1160F RVW MEDS BY RX/DR IN RCRD: CPT | Performed by: ORTHOPAEDIC SURGERY

## 2024-10-14 NOTE — PROGRESS NOTES
[facility] [facility]    Subjective     Chief Complaint   Patient presents with    Left Hip - Pain        HPI    Janet Darden is a 79 y.o. female who presents with new problem of: left hip pain.  Onset: atraumatic and gradual in nature. The issue has been ongoing for 10 day(s). Pain was 5/10 on the pain scale, but has improved. Pain is described as stabbing. Associated symptoms include pain. The pain is worse with walking and rising from seated position; pain medication and/or NSAID improve the pain. Previous treatments have included: NSAIDS.  She was sitting in a folding chair, seated low getting up and down several times, noted pain a couple of days later.  The pain has improved.  She want to have it checked out.  Pain was located in the groin region.    I have reviewed the following portions of the patient's history and agree with: History of Present Illness and Review of Systems    Patient Active Problem List   Diagnosis    Primary osteoarthritis of left hip    HTN (hypertension)    S/P total left hip arthroplasty    Postoperative pain     Past Medical History:   Diagnosis Date    Arthritis     Arthritis of back     Hip arthrosis     Hypertension     Osteoporosis       Past Surgical History:   Procedure Laterality Date    CHOLECYSTECTOMY  1970    COLONOSCOPY      HYSTERECTOMY  1994    MOUTH SURGERY  1987    jaw surgery    TOTAL HIP ARTHROPLASTY Left 5/5/2023    Procedure: MODIFIED ANTERIOR TOTAL HIP ARTHROPLASTY - LEFT;  Surgeon: Ezekiel Rodriguez MD;  Location: [facility];  Service: Orthopedics;  Laterality: Left;      Family History   Problem Relation Age of Onset    Cancer Mother     Diabetes Mother     Cancer Father      Social History     Socioeconomic History    Marital status:    Tobacco Use    Smoking status: Never    Smokeless tobacco: Never   Vaping Use    Vaping status: Never Used   Substance and Sexual Activity    Alcohol use: Never    Drug use: Never    Sexual activity:  Defer      Current Outpatient Medications on File Prior to Visit   Medication Sig Dispense Refill    amLODIPine (NORVASC) 5 MG tablet Take 1 tablet by mouth Daily.      Ascorbic Acid (VITAMIN C PO) Take 1,000 mg by mouth. Patient been holding since 4/12/23      cetirizine (zyrTEC) 10 MG tablet Take 1 tablet by mouth As Needed.      traZODone (DESYREL) 50 MG tablet Take 1 tablet by mouth every night at bedtime.      triamterene-hydrochlorothiazide (DYAZIDE) 37.5-25 MG per capsule Take 1 capsule by mouth Daily.       No current facility-administered medications on file prior to visit.      Allergies   Allergen Reactions    Prednisone Other (See Comments)     Causes insomnia        Review of Systems   Constitutional:  Negative for activity change, appetite change, chills, diaphoresis, fatigue, fever and unexpected weight change.   HENT:  Negative for congestion, dental problem, drooling, ear discharge, ear pain, facial swelling, hearing loss, mouth sores, nosebleeds, postnasal drip, rhinorrhea, sinus pressure, sneezing, sore throat, tinnitus, trouble swallowing and voice change.    Eyes:  Negative for photophobia, pain, discharge, redness, itching and visual disturbance.   Respiratory:  Negative for apnea, cough, choking, chest tightness, shortness of breath, wheezing and stridor.    Cardiovascular:  Negative for chest pain, palpitations and leg swelling.   Gastrointestinal:  Negative for abdominal distention, abdominal pain, anal bleeding, blood in stool, constipation, diarrhea, nausea, rectal pain and vomiting.   Endocrine: Negative for cold intolerance, heat intolerance, polydipsia, polyphagia and polyuria.   Genitourinary:  Negative for decreased urine volume, difficulty urinating, dysuria, enuresis, flank pain, frequency, genital sores, hematuria and urgency.   Musculoskeletal:  Positive for arthralgias. Negative for back pain, gait problem, joint swelling, myalgias, neck pain and neck stiffness.   Skin:  Negative  "for color change, pallor, rash and wound.   Allergic/Immunologic: Negative for environmental allergies, food allergies and immunocompromised state.   Neurological:  Negative for dizziness, tremors, seizures, syncope, facial asymmetry, speech difficulty, weakness, light-headedness, numbness and headaches.   Hematological:  Negative for adenopathy. Does not bruise/bleed easily.   Psychiatric/Behavioral:  Negative for agitation, behavioral problems, confusion, decreased concentration, dysphoric mood, hallucinations, self-injury, sleep disturbance and suicidal ideas. The patient is not nervous/anxious and is not hyperactive.         Objective      Physical Exam  /90   Ht 154.9 cm (60.98\")   Wt 68.9 kg (152 lb)   BMI 28.74 kg/m²     Body mass index is 28.74 kg/m².  BMI is >= 25 and <30. (Overweight) The following options were offered after discussion;: referral to primary care        General:   Mental Status:  Alert   Appearance: Cooperative, in no acute distress   Build and Nutrition: Well-nourished well-developed female   Orientation: Alert and oriented to person, place and time   Posture: Normal   Gait: Nonantalgic    Integument:   Left hip: Wound is well-healed with no signs of infection    Lower Extremity:   Left Hip:    Tenderness:  None    Swelling:  None    Crepitus:  None    Range of motion:  External Rotation: 30°       Internal Rotation: 30°       Flexion:  100°       Extension:  0°    Deformities:  None  Functional testing: Negative Stinchfield    No leg length discrepancy      Imaging/Studies      Imaging Results (Last 24 Hours)       Procedure Component Value Units Date/Time    XR Hip With or Without Pelvis 2 - 3 View Left [[id number]] Resulted: 10/14/24 0954     Updated: 10/14/24 0954    Narrative:      Left Hip Radiographs  Indication: status-post left total hip arthroplasty  Views: low AP pelvis and lateral of the left hip    Comparison: no change compared to prior study, 5/22/2024    Findings: "   The components are well aligned, with no signs of loosening or failure.               Assessment and Plan     Diagnoses and all orders for this visit:    1. Left hip pain (Primary)  -     XR Hip With or Without Pelvis 2 - 3 View Left    2. Status post total hip replacement, left        1. Left hip pain    2. Status post total hip replacement, left          I reviewed my findings with the patient.  I suspect that she had some soft tissue impingement with the seated position that created pain, that pain has improved since its onset.  I anticipate further improvement, and I will be happy to see her back for any worsening or problems.  Otherwise, I will see her back in 4 years, for what will be her 5-year checkup for her total hip arthroplasty.    Return for at regularly scheduled appointment.      Ezekiel Rodriguez MD  10/14/24  10:04 EDT      Dictated Utilizing Dragon Dictation

## 2025-03-07 ENCOUNTER — HOSPITAL ENCOUNTER (OUTPATIENT)
Dept: HOSPITAL 22 - RT | Age: 80
Discharge: HOME | End: 2025-03-07
Payer: MEDICARE

## 2025-03-07 DIAGNOSIS — R00.2: Primary | ICD-10-CM

## 2025-03-07 PROCEDURE — 93226 XTRNL ECG REC<48 HR SCAN A/R: CPT

## 2025-03-07 PROCEDURE — 93005 ELECTROCARDIOGRAM TRACING: CPT

## 2025-03-07 PROCEDURE — 93225 XTRNL ECG REC<48 HRS REC: CPT

## 2025-03-17 ENCOUNTER — HOSPITAL ENCOUNTER (OUTPATIENT)
Dept: HOSPITAL 22 - RT | Age: 80
Discharge: HOME | End: 2025-03-17
Payer: MEDICARE

## 2025-03-17 DIAGNOSIS — R00.2: Primary | ICD-10-CM

## 2025-03-17 DIAGNOSIS — R55: ICD-10-CM

## 2025-03-17 PROCEDURE — 93270 REMOTE 30 DAY ECG REV/REPORT: CPT

## (undated) DEVICE — SYS CLS SKIN PREMIERPRO EXOFINFUSION 22CM

## (undated) DEVICE — GLV SURG SENSICARE W/ALOE PF LF 9 STRL

## (undated) DEVICE — ELECTRD BLD EZ CLN STD 4IN

## (undated) DEVICE — PCH INST SURG INVISISHIELD 2PCKT

## (undated) DEVICE — PATIENT RETURN ELECTRODE, SINGLE-USE, CONTACT QUALITY MONITORING, ADULT, WITH 9FT CORD, FOR PATIENTS WEIGING OVER 33LBS. (15KG): Brand: MEGADYNE

## (undated) DEVICE — PK HIP TOTL UNIV 10

## (undated) DEVICE — C-ARM DRAPE: Brand: DEROYAL

## (undated) DEVICE — SPNG GZ WOVN 4X4IN 12PLY 10/BX STRL

## (undated) DEVICE — C-ARM: Brand: UNBRANDED

## (undated) DEVICE — DRAPE,REIN 53X77,STERILE: Brand: MEDLINE

## (undated) DEVICE — STCKNT IMPERV 12IN STRL

## (undated) DEVICE — BNDG ELAS CO-FLEX SLF ADHR 6IN 5YD LF STRL

## (undated) DEVICE — GLV SURG PREMIERPRO MIC LTX PF SZ8.5 BRN

## (undated) DEVICE — 3M™ MEDIPORE™ H SOFT CLOTH SURGICAL TAPE, 2863, 3 IN X 10 YD, 12/CASE: Brand: 3M™ MEDIPORE™

## (undated) DEVICE — STRYKER PERFORMANCE SERIES SAGITTAL BLADE: Brand: STRYKER PERFORMANCE SERIES

## (undated) DEVICE — TRAP FLD MINIVAC MEGADYNE 100ML

## (undated) DEVICE — TBG PENCL TELESCP MEGADYNE SMOKE EVAC 10FT

## (undated) DEVICE — ANTIBACTERIAL UNDYED BRAIDED (POLYGLACTIN 910), SYNTHETIC ABSORBABLE SUTURE: Brand: COATED VICRYL

## (undated) DEVICE — DRAPE,T,LIMB,BILATERAL,STERILE: Brand: MEDLINE

## (undated) DEVICE — TP SILK DURAPORE 3IN

## (undated) DEVICE — TOWEL,OR,DSP,ST,BLUE,STD,4/PK,20PK/CS: Brand: MEDLINE

## (undated) DEVICE — BLANKT WARM UPPR/BDY ARM/OUT 57X196CM

## (undated) DEVICE — DRAPE,TOP,102X53,STERILE: Brand: MEDLINE